# Patient Record
Sex: FEMALE | Race: BLACK OR AFRICAN AMERICAN | Employment: FULL TIME | URBAN - METROPOLITAN AREA
[De-identification: names, ages, dates, MRNs, and addresses within clinical notes are randomized per-mention and may not be internally consistent; named-entity substitution may affect disease eponyms.]

---

## 2021-01-25 ENCOUNTER — OFFICE VISIT (OUTPATIENT)
Dept: FAMILY MEDICINE CLINIC | Age: 26
End: 2021-01-25
Payer: COMMERCIAL

## 2021-01-25 VITALS
TEMPERATURE: 98.3 F | OXYGEN SATURATION: 100 % | HEIGHT: 62 IN | WEIGHT: 141.6 LBS | BODY MASS INDEX: 26.06 KG/M2 | HEART RATE: 106 BPM | SYSTOLIC BLOOD PRESSURE: 122 MMHG | DIASTOLIC BLOOD PRESSURE: 80 MMHG

## 2021-01-25 DIAGNOSIS — Z80.0 FAMILY HISTORY OF COLON CANCER IN FATHER: ICD-10-CM

## 2021-01-25 DIAGNOSIS — Z00.00 ROUTINE GENERAL MEDICAL EXAMINATION AT HEALTH CARE FACILITY: Primary | ICD-10-CM

## 2021-01-25 DIAGNOSIS — Z12.4 CERVICAL CANCER SCREENING: ICD-10-CM

## 2021-01-25 DIAGNOSIS — Z13.220 SCREENING FOR LIPID DISORDERS: ICD-10-CM

## 2021-01-25 DIAGNOSIS — Z11.3 SCREENING FOR STD (SEXUALLY TRANSMITTED DISEASE): ICD-10-CM

## 2021-01-25 DIAGNOSIS — Z13.1 SCREENING FOR DIABETES MELLITUS: ICD-10-CM

## 2021-01-25 PROCEDURE — 99385 PREV VISIT NEW AGE 18-39: CPT | Performed by: FAMILY MEDICINE

## 2021-01-25 NOTE — PROGRESS NOTES
Chief Complaint   Patient presents with    Complete Physical       1. Have you been to the ER, urgent care clinic since your last visit? Hospitalized since your last visit? No    2. Have you seen or consulted any other health care providers outside of the [address] [place] [address] since your last visit? Include any pap smears or colon screening. No    Health Maintenance Due   Topic Date Due    HPV Age 9Y-34Y (1 - 2-dose series) 03/13/2008    DTaP/Tdap/Td series (1 - Tdap) 03/13/2018    PAP AKA CERVICAL CYTOLOGY  03/13/2018    Flu Vaccine (1) 09/01/2020       3 most recent PHQ Screens 1/25/2021   Little interest or pleasure in doing things Not at all   Feeling down, depressed, irritable, or hopeless Not at all   Total Score PHQ 2 0       Abuse Screening Questionnaire 1/25/2021   Do you ever feel afraid of your partner? N   Are you in a relationship with someone who physically or mentally threatens you? N   Is it safe for you to go home? Y       No flowsheet data found.

## 2021-01-25 NOTE — PROGRESS NOTES
Patient Name: Laura Ortiz   MRN: [id number]    Cholo Vasquez is a 23 y.o. female who presents with the following: Here to establish care with new PCP. Never had a pap before. Has female partners. Unsure if she is UTD on vaccines. Went to [employer] for college. Her father was diagnosed with stage IV colon cancer last year at age 64years old. Pt asymptomatic. Review of Systems   Constitutional: Negative for chills, fever, malaise/fatigue and weight loss. HENT: Negative for hearing loss, nosebleeds and sore throat. Respiratory: Negative for cough, sputum production, shortness of breath and wheezing. Cardiovascular: Negative for chest pain, palpitations, leg swelling and PND. Gastrointestinal: Negative for abdominal pain, blood in stool, constipation, diarrhea, nausea and vomiting. Genitourinary: Negative for dysuria, frequency and urgency. Musculoskeletal: Negative for back pain, falls, joint pain, myalgias and neck pain. Skin: Negative for itching and rash. Neurological: Negative for dizziness, sensory change, focal weakness and loss of consciousness. Psychiatric/Behavioral: Negative for depression. The patient is not nervous/anxious. All other systems reviewed and are negative. The patient's medications, allergies, past medical history, surgical history, family history and social history were reviewed and updated where appropriate. Prior to Admission medications    Not on File       No Known Allergies      Past Medical History:   Diagnosis Date    Family history of colon cancer in father 1/25/2021       History reviewed. No pertinent surgical history.     Family History   Problem Relation Age of Onset    Hypertension Mother     Cancer Father         64 years   Zannie Cowden Asthma Sister     Asthma Brother        Social History     Socioeconomic History    Marital status: SINGLE     Spouse name: Not on file    Number of children: Not on file    Years of education: Not on file    Highest education level: Not on file   Occupational History    Not on file   Social Needs    Financial resource strain: Not on file    Food insecurity     Worry: Not on file     Inability: Not on file    Transportation needs     Medical: Not on file     Non-medical: Not on file   Tobacco Use    Smoking status: Never Smoker    Smokeless tobacco: Never Used   Substance and Sexual Activity    Alcohol use: Yes     Alcohol/week: 3.0 standard drinks     Types: 2 Glasses of wine, 1 Shots of liquor per week     Frequency: Monthly or less     Drinks per session: 1 or 2     Binge frequency: Never    Drug use: Never    Sexual activity: Yes     Partners: Female   Lifestyle    Physical activity     Days per week: Not on file     Minutes per session: Not on file    Stress: Not on file   Relationships    Social connections     Talks on phone: Not on file     Gets together: Not on file     Attends Church service: Not on file     Active member of club or organization: Not on file     Attends meetings of clubs or organizations: Not on file     Relationship status: Not on file    Intimate partner violence     Fear of current or ex partner: Not on file     Emotionally abused: Not on file     Physically abused: Not on file     Forced sexual activity: Not on file   Other Topics Concern    Not on file   Social History Narrative    Not on file         OBJECTIVE    Visit Vitals  /80 (BP 1 Location: Right arm, BP Patient Position: Sitting)   Pulse (!) 106   Temp 98.3 °F (36.8 °C) (Temporal)   Ht 5' 2\" (1.575 m)   Wt 141 lb 9.6 oz (64.2 kg)   LMP 01/04/2021 (Approximate)   SpO2 100%   BMI 25.90 kg/m²       Physical Exam  Constitutional:       General: She is not in acute distress. Appearance: She is not diaphoretic. HENT:      Head: Normocephalic.       Right Ear: External ear normal.      Left Ear: External ear normal.   Eyes:      Extraocular Movements: EOM normal. Conjunctiva/sclera: Conjunctivae normal.      Pupils: Pupils are equal, round, and reactive to light. Cardiovascular:      Rate and Rhythm: Normal rate and regular rhythm. Pulses: Intact distal pulses. Heart sounds: Normal heart sounds. No murmur. No friction rub. No gallop. Pulmonary:      Effort: Pulmonary effort is normal. No respiratory distress. Breath sounds: Normal breath sounds. No wheezing or rales. Abdominal:      General: Bowel sounds are normal. There is no distension. Palpations: Abdomen is soft. Tenderness: There is no abdominal tenderness. There is no guarding or rebound. Skin:     General: Skin is warm and dry. Neurological:      Mental Status: She is alert and oriented to person, place, and time. Psychiatric:         Judgment: Judgment normal.           ASSESSMENT AND PLAN  Consuelo Wallis is a 23 y.o. female who presents today for:    1. Routine general medical examination at health care facility  Reviewed age appropriate screening tests as recommended by the [place]PST[place] Preventive Services Database with patient today. 2. Screening for diabetes mellitus  - HEMOGLOBIN A1C WITH EAG; Future    3. Screening for lipid disorders  - CBC W/O DIFF; Future  - METABOLIC PANEL, COMPREHENSIVE; Future  - LIPID PANEL; Future    4. Screening for STD (sexually transmitted disease)  - CT/NG/T.VAGINALIS AMPLIFICATION; Future  - RPR; Future  - HIV 1/2 AG/AB, 4TH GENERATION,W RFLX CONFIRM; Future  - HEPATITIS PANEL, ACUTE; Future    5. Cervical cancer screening  - REFERRAL TO OBSTETRICS AND GYNECOLOGY    6. Family history of colon cancer in father  Will need earlier screening colonoscopy; possibly age 36 or sooner prn symptoms. There are no discontinued medications. Follow-up and Dispositions    · Return in about 1 year (around 1/25/2022) for CPE (30 min).    Follow-up and Disposition History          Treatment risks/benefits/costs/interactions/alternatives discussed with patient. Advised patient to call back or return to office if symptoms worsen/change/persist. If patient cannot reach us or should anything more severe/urgent arise he/she should proceed directly to the nearest emergency department. Discussed expected course/resolution/complications of diagnosis in detail with patient. Patient expressed understanding with the diagnosis and plan. Chetna Barrett M.D.

## 2021-01-28 LAB
ALBUMIN SERPL-MCNC: 4.4 G/DL (ref 3.5–5)
ALBUMIN/GLOB SERPL: 1.4 {RATIO} (ref 1.1–2.2)
ALP SERPL-CCNC: 73 U/L (ref 45–117)
ALT SERPL-CCNC: 18 U/L (ref 12–78)
ANION GAP SERPL CALC-SCNC: 6 MMOL/L (ref 5–15)
AST SERPL-CCNC: 20 U/L (ref 15–37)
BILIRUB SERPL-MCNC: 0.3 MG/DL (ref 0.2–1)
BUN SERPL-MCNC: 13 MG/DL (ref 6–20)
BUN/CREAT SERPL: 20 (ref 12–20)
C TRACH RRNA SPEC QL NAA+PROBE: NEGATIVE
CALCIUM SERPL-MCNC: 8.9 MG/DL (ref 8.5–10.1)
CHLORIDE SERPL-SCNC: 107 MMOL/L (ref 97–108)
CHOLEST SERPL-MCNC: 191 MG/DL
CO2 SERPL-SCNC: 24 MMOL/L (ref 21–32)
CREAT SERPL-MCNC: 0.65 MG/DL (ref 0.55–1.02)
ERYTHROCYTE [DISTWIDTH] IN BLOOD BY AUTOMATED COUNT: 12.7 % (ref 11.5–14.5)
EST. AVERAGE GLUCOSE BLD GHB EST-MCNC: 100 MG/DL
GLOBULIN SER CALC-MCNC: 3.2 G/DL (ref 2–4)
GLUCOSE SERPL-MCNC: 72 MG/DL (ref 65–100)
HAV IGM SER QL: NONREACTIVE
HBA1C MFR BLD: 5.1 % (ref 4–5.6)
HBV CORE IGM SER QL: NONREACTIVE
HBV SURFACE AG SER QL: <0.1 INDEX
HBV SURFACE AG SER QL: NEGATIVE
HCT VFR BLD AUTO: 39.4 % (ref 35–47)
HCV AB SERPL QL IA: NONREACTIVE
HCV COMMENT,HCGAC: NORMAL
HDLC SERPL-MCNC: 67 MG/DL
HDLC SERPL: 2.9 {RATIO} (ref 0–5)
HGB BLD-MCNC: 12.5 G/DL (ref 11.5–16)
HIV 1+2 AB+HIV1 P24 AG SERPL QL IA: NONREACTIVE
HIV12 RESULT COMMENT, HHIVC: NORMAL
LDLC SERPL CALC-MCNC: 110.4 MG/DL (ref 0–100)
LIPID PROFILE,FLP: ABNORMAL
MCH RBC QN AUTO: 27.1 PG (ref 26–34)
MCHC RBC AUTO-ENTMCNC: 31.7 G/DL (ref 30–36.5)
MCV RBC AUTO: 85.3 FL (ref 80–99)
N GONORRHOEA RRNA SPEC QL NAA+PROBE: NEGATIVE
NRBC # BLD: 0 K/UL (ref 0–0.01)
NRBC BLD-RTO: 0 PER 100 WBC
PLATELET # BLD AUTO: 291 K/UL (ref 150–400)
PMV BLD AUTO: 10.4 FL (ref 8.9–12.9)
POTASSIUM SERPL-SCNC: 4.2 MMOL/L (ref 3.5–5.1)
PROT SERPL-MCNC: 7.6 G/DL (ref 6.4–8.2)
RBC # BLD AUTO: 4.62 M/UL (ref 3.8–5.2)
RPR SER QL: NONREACTIVE
SODIUM SERPL-SCNC: 137 MMOL/L (ref 136–145)
SP1: NORMAL
SP2: NORMAL
SP3: NORMAL
SPECIMEN SOURCE: NORMAL
T VAGINALIS RRNA VAG QL NAA+PROBE: NEGATIVE
TRIGL SERPL-MCNC: 68 MG/DL (ref ?–150)
VLDLC SERPL CALC-MCNC: 13.6 MG/DL
WBC # BLD AUTO: 6.7 K/UL (ref 3.6–11)

## 2021-01-28 NOTE — PROGRESS NOTES
Dear Ms. Ami Pittman,    I wanted to follow up on your recent test results:    Cholesterol was a little high. I would encourage healthy food choices and regular exercise before starting medications for this.   Other labs are normal.

## 2021-03-16 ENCOUNTER — OFFICE VISIT (OUTPATIENT)
Dept: OBGYN CLINIC | Age: 26
End: 2021-03-16
Payer: COMMERCIAL

## 2021-03-16 VITALS
BODY MASS INDEX: 25.83 KG/M2 | SYSTOLIC BLOOD PRESSURE: 117 MMHG | WEIGHT: 140.38 LBS | HEIGHT: 62 IN | DIASTOLIC BLOOD PRESSURE: 62 MMHG

## 2021-03-16 DIAGNOSIS — Z01.419 ENCOUNTER FOR GYNECOLOGICAL EXAMINATION WITHOUT ABNORMAL FINDING: Primary | ICD-10-CM

## 2021-03-16 PROCEDURE — 99385 PREV VISIT NEW AGE 18-39: CPT | Performed by: OBSTETRICS & GYNECOLOGY

## 2021-03-16 NOTE — PROGRESS NOTES
Annual exam    Neville Lee is a 27 y.o. G0 presenting for annual exam.     Her main concerns today include occasionally with really painful cramping with menses. Reports sudden onset of sharp debilitating pains that last about 30 min. Reports it does not occur every month, but randomly. In total has happened about 10 times. Went to [employer]. 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 Accepts STI testing today. Ob/Gyn Hx:  G0  LMP- 3/4/21  Menses- regular, monthly, moderate flow, but occasionally with severe cramping   Contraception- n/a   STI- requested  ? SA- yes, female partners    Health maintenance:  Pap- never  Gardasil- interested in initiating series    Past Medical History:   Diagnosis Date    Family history of colon cancer in father 1/25/2021       No past surgical history on file. Family History   Problem Relation Age of Onset    Hypertension Mother     Cancer Father         64 years   Trina Solum Asthma Sister     Asthma Brother        Social History     Socioeconomic History    Marital status: SINGLE     Spouse name: Not on file    Number of children: Not on file    Years of education: Not on file    Highest education level: Not on file   Occupational History    Not on file   Social Needs    Financial resource strain: Not on file    Food insecurity     Worry: Not on file     Inability: Not on file    Transportation needs     Medical: Not on file     Non-medical: Not on file   Tobacco Use    Smoking status: Never Smoker    Smokeless tobacco: Never Used   Substance and Sexual Activity    Alcohol use:  Yes     Alcohol/week: 3.0 standard drinks     Types: 2 Glasses of wine, 1 Shots of liquor per week     Frequency: Monthly or less     Drinks per session: 1 or 2     Binge frequency: Never    Drug use: Never    Sexual activity: Yes     Partners: Female   Lifestyle    Physical activity     Days per week: Not on file     Minutes per session: Not on file    Stress: Not on file   Relationships    Social connections     Talks on phone: Not on file     Gets together: Not on file     Attends Taoist service: Not on file     Active member of club or organization: Not on file     Attends meetings of clubs or organizations: Not on file     Relationship status: Not on file    Intimate partner violence     Fear of current or ex partner: Not on file     Emotionally abused: Not on file     Physically abused: Not on file     Forced sexual activity: Not on file   Other Topics Concern    Not on file   Social History Narrative    Not on file           No Known Allergies    Review of Systems - History obtained from the patient  Constitutional: negative for weight loss, fever, night sweats  HEENT: negative for hearing loss, earache, congestion, snoring, sorethroat  CV: negative for chest pain, palpitations, edema  Resp: negative for cough, shortness of breath, wheezing  GI: negative for change in bowel habits, abdominal pain, black or bloody stools  : negative for frequency, dysuria, hematuria, vaginal discharge  MSK: negative for back pain, joint pain, muscle pain  Breast: negative for breast lumps, nipple discharge, galactorrhea  Skin :negative for itching, rash, hives  Neuro: negative for dizziness, headache, confusion, weakness  Psych: negative for anxiety, depression, change in mood  Heme/lymph: negative for bleeding, bruising, pallor    Physical Exam  Visit Vitals  /62   Ht 5' 2\" (1.575 m)   Wt 140 lb 6 oz (63.7 kg)   LMP 02/28/2021   BMI 25.67 kg/m²     Constitutional  · Appearance: well-nourished, well developed, alert, in no acute distress    HENT  · Head and Face: appears normal    Neck  · Inspection/Palpation: normal appearance, no masses or tenderness  · Lymph Nodes: no lymphadenopathy present  · Thyroid: gland size normal, nontender, no nodules or masses present on palpation    Chest  · Respiratory Effort: non-labored breathing  · Auscultation: CTAB, normal breath sounds    Cardiovascular  · Heart:  · Auscultation: regular rate and rhythm without murmur  · Extremities: no peripheral edema    Breasts  · Inspection of Breasts: breasts symmetrical, no skin changes, no discharge present, nipple appearance normal, no skin retraction present  · Palpation of Breasts and Axillae: no masses present on palpation, no breast tenderness  · Axillary Lymph Nodes: no lymphadenopathy present    Gastrointestinal  · Abdominal Examination: abdomen non-tender to palpation, normal bowel sounds, no masses present  · Liver and spleen: no hepatomegaly present, spleen not palpable  · Hernias: no hernias identified    Genitourinary  · External Genitalia: normal appearance for age, no discharge present, no tenderness present, no inflammatory lesions present, no masses present, no atrophy present  · Vagina: normal vaginal vault without central or paravaginal defects, no discharge present, no inflammatory lesions present, no masses present  · Bladder: non-tender to palpation  · Urethra: appears normal  · Cervix: normal   · Uterus: normal size, shape and consistency  · Adnexa: no adnexal tenderness present, no adnexal masses present  · Perineum: perineum within normal limits, no evidence of trauma, no rashes or skin lesions present    Skin  · General Inspection: no rash, no lesions identified    Neurologic/Psychiatric  · Mental Status:  · Orientation: grossly oriented to person, place and time  · Mood and Affect: mood normal, affect appropriate      Assessment/Plan:  27 y.o. G0 presenting for annual exam. Overall doing well. +dysmenorrhea/pelvic pains.     Health Maintenance:  -diet, exercise, healthy lifestyle  -pap today  -STI screening today (endocervix)  -Gardasil - will return to initiate series (after she has completed covid vaccination process, just received 1st dose of pfizer)  -[facility] referral  -reviewed hormonal contraceptive options for menstrual suppression for management of dysmenorrhea, pt would like to hold off for now  -nsaids prn    RTC: 1 month for US and riri Gould MD  3/16/2021  9:25 AM

## 2021-03-16 NOTE — PATIENT INSTRUCTIONS
Well Visit, Ages 25 to 48: Care Instructions Your Care Instructions Physical exams can help you stay healthy. Your doctor has checked your overall health and may have suggested ways to take good care of yourself. He or she also may have recommended tests. At home, you can help prevent illness with healthy eating, regular exercise, and other steps. Follow-up care is a key part of your treatment and safety. Be sure to make and go to all appointments, and call your doctor if you are having problems. It's also a good idea to know your test results and keep a list of the medicines you take. How can you care for yourself at home? · Reach and stay at a healthy weight. This will lower your risk for many problems, such as obesity, diabetes, heart disease, and high blood pressure. · Get at least 30 minutes of physical activity on most days of the week. Walking is a good choice. You also may want to do other activities, such as running, swimming, cycling, or playing tennis or team sports. Discuss any changes in your exercise program with your doctor. · Do not smoke or allow others to smoke around you. If you need help quitting, talk to your doctor about stop-smoking programs and medicines. These can increase your chances of quitting for good. · Talk to your doctor about whether you have any risk factors for sexually transmitted infections (STIs). Having one sex partner (who does not have STIs and does not have sex with anyone else) is a good way to avoid these infections. · Use birth control if you do not want to have children at this time. Talk with your doctor about the choices available and what might be best for you. · Protect your skin from too much sun. When you're outdoors from 10 a.m. to 4 p.m., stay in the shade or cover up with clothing and a hat with a wide brim. Wear sunglasses that block UV rays. Even when it's cloudy, put broad-spectrum sunscreen (SPF 30 or higher) on any exposed skin.  
· See a dentist one or two times a year for checkups and to have your teeth cleaned. · Wear a seat belt in the car. Follow your doctor's advice about when to have certain tests. These tests can spot problems early. For everyone · Cholesterol. Have the fat (cholesterol) in your blood tested after age 21. Your doctor will tell you how often to have this done based on your age, family history, or other things that can increase your risk for heart disease. · Blood pressure. Have your blood pressure checked during a routine doctor visit. Your doctor will tell you how often to check your blood pressure based on your age, your blood pressure results, and other factors. · Vision. Talk with your doctor about how often to have a glaucoma test. 
· Diabetes. Ask your doctor whether you should have tests for diabetes. · Colon cancer. Your risk for colorectal cancer gets higher as you get older. Some experts say that adults should start regular screening at age 48 and stop at age 76. Others say to start before age 48 or continue after age 76. Talk with your doctor about your risk and when to start and stop screening. For women · Breast exam and mammogram. Talk to your doctor about when you should have a clinical breast exam and a mammogram. Medical experts differ on whether and how often women under 50 should have these tests. Your doctor can help you decide what is right for you. · Cervical cancer screening test and pelvic exam. Begin with a Pap test at age 24. The test often is part of a pelvic exam. Starting at age 27, you may choose to have a Pap test, an HPV test, or both tests at the same time (called co-testing). Talk with your doctor about how often to have testing. · Tests for sexually transmitted infections (STIs). Ask whether you should have tests for STIs. You may be at risk if you have sex with more than one person, especially if your partners do not wear condoms. For men · Tests for sexually transmitted infections (STIs). Ask whether you should have tests for STIs. You may be at risk if you have sex with more than one person, especially if you do not wear a condom. · Testicular cancer exam. Ask your doctor whether you should check your testicles regularly. · Prostate exam. Talk to your doctor about whether you should have a blood test (called a PSA test) for prostate cancer. Experts differ on whether and when men should have this test. Some experts suggest it if you are older than 39 and are -American or have a father or brother who got prostate cancer when he was younger than 72. When should you call for help? Watch closely for changes in your health, and be sure to contact your doctor if you have any problems or symptoms that concern you. Where can you learn more? Go to http://radha-lani.info/ Enter P072 in the search box to learn more about \"Well Visit, Ages 25 to 48: Care Instructions. \" Current as of: May 27, 2020               Content Version: 12.6 © 2006-2020 [employer]. Care instructions adapted under license by [employer] (which disclaims liability or warranty for this information). If you have questions about a medical condition or this instruction, always ask your healthcare professional. [place] [address] any warranty or liability for your use of this information.

## 2021-03-20 LAB
C TRACH RRNA CVX QL NAA+PROBE: NEGATIVE
CYTOLOGIST CVX/VAG CYTO: NORMAL
CYTOLOGY CVX/VAG DOC CYTO: NORMAL
CYTOLOGY CVX/VAG DOC THIN PREP: NORMAL
DX ICD CODE: NORMAL
LABCORP, 190119: NORMAL
Lab: NORMAL
Lab: NORMAL
N GONORRHOEA RRNA CVX QL NAA+PROBE: NEGATIVE
OTHER STN SPEC: NORMAL
STAT OF ADQ CVX/VAG CYTO-IMP: NORMAL
T VAGINALIS RRNA SPEC QL NAA+PROBE: NEGATIVE

## 2021-04-13 ENCOUNTER — OFFICE VISIT (OUTPATIENT)
Dept: OBGYN CLINIC | Age: 26
End: 2021-04-13

## 2021-04-13 VITALS
DIASTOLIC BLOOD PRESSURE: 74 MMHG | WEIGHT: 139 LBS | SYSTOLIC BLOOD PRESSURE: 116 MMHG | HEIGHT: 62 IN | BODY MASS INDEX: 25.58 KG/M2

## 2021-04-13 DIAGNOSIS — R10.2 PELVIC PAIN IN FEMALE: ICD-10-CM

## 2021-04-13 DIAGNOSIS — N94.6 DYSMENORRHEA: ICD-10-CM

## 2021-04-13 DIAGNOSIS — Z23 ENCOUNTER FOR IMMUNIZATION: Primary | ICD-10-CM

## 2021-04-13 PROCEDURE — 90471 IMMUNIZATION ADMIN: CPT | Performed by: OBSTETRICS & GYNECOLOGY

## 2021-04-13 PROCEDURE — 90651 9VHPV VACCINE 2/3 DOSE IM: CPT | Performed by: OBSTETRICS & GYNECOLOGY

## 2021-04-13 PROCEDURE — 99213 OFFICE O/P EST LOW 20 MIN: CPT | Performed by: OBSTETRICS & GYNECOLOGY

## 2021-04-13 NOTE — PATIENT INSTRUCTIONS
Painful Menstrual Cramps: Care Instructions Your Care Instructions Painful menstrual cramps are very common. Many women go to the doctor because of bad cramps when they get their period. You may have cramps in your back, thighs, and belly. You may also have diarrhea, constipation, or nausea. Some women also get dizzy. Pain medicine and home treatment can help you feel better. Follow-up care is a key part of your treatment and safety. Be sure to make and go to all appointments, and call your doctor if you are having problems. It's also a good idea to know your test results and keep a list of the medicines you take. How can you care for yourself at home? · Take anti-inflammatory medicines for pain. Ibuprofen (Advil, Motrin) and naproxen (Aleve) usually work better than aspirin. ? Be safe with medicines. Talk to your doctor or pharmacist before you take any of these medicines. They may not be safe if you take other medicines or have other health problems. ? Start taking the recommended dose of pain medicine as soon as you start to feel pain. Or you can start on the day before your period. Keep taking the medicine for as many days as you have cramps. ? If anti-inflammatory medicines don't help, try acetaminophen (Tylenol). ? Do not take two or more pain medicines at the same time unless the doctor told you to. Many pain medicines have acetaminophen, which is Tylenol. Too much acetaminophen (Tylenol) can be harmful. ? Read and follow all instructions on the label. · Put a heating pad set on low or a hot water bottle on your belly. Or take a warm bath. Heat improves blood flow and may help with pain. · Lie down and put a pillow under your knees. Or lie on your side and bring your knees up to your chest. This will help with any back pressure. · Get at least 30 minutes of exercise on most days of the week. This improves blood flow and may decrease pain. Walking is a good choice.  You also may want to do other activities, such as running, swimming, cycling, or playing tennis or team sports. When should you call for help? Call your doctor now or seek immediate medical care if: 
  · You have new or worse belly or pelvic pain.  
  · You have severe vaginal bleeding. Watch closely for changes in your health, and be sure to contact your doctor if: 
  · You have unusual vaginal bleeding.  
  · You do not get better as expected. Where can you learn more? Go to http://www.gray.[employer]/ Enter [id number] in the search box to learn more about \"Painful Menstrual Cramps: Care Instructions. \" Current as of: July 17, 2020               Content Version: 12.8 © 2006-2021 [employer]. Care instructions adapted under license by [employer] (which disclaims liability or warranty for this information). If you have questions about a medical condition or this instruction, always ask your healthcare professional. [place] [address] any warranty or liability for your use of this information. HPV (Human Papillomavirus) Vaccine Gardasil®: What You Need to Know What is HPV? Genital human papillomavirus (HPV) is the most common sexually transmitted virus in the [place]. More than half of sexually active men and women are infected with HPV at some time in their lives. About 20 million Americans are currently infected, and about 6 million more get infected each year. HPV is usually spread through sexual contact. Most HPV infections don't cause any symptoms, and go away on their own. But HPV can cause cervical cancer in women. Cervical cancer is the 2nd leading cause of cancer deaths among women around the world. In the [place], about 12,000 women get cervical cancer every year and about 4,000 are expected to die from it.  
HPV is also associated with several less common cancers, such as vaginal and vulvar cancers in women, and anal and oropharyngeal (back of the throat, including base of tongue and tonsils) cancers in both men and women. HPV can also cause genital warts and warts in the throat. There is no cure for HPV infection, but some of the problems it causes can be treated. HPV vaccineWhy get vaccinated? The HPV vaccine you are getting is one of two vaccines that can be given to prevent HPV. It may be given to both males and females. This vaccine can prevent most cases of cervical cancer in females, if it is given before exposure to the virus. In addition, it can prevent vaginal and vulvar cancer in females, and genital warts and anal cancer in both males and females. Protection from HPV vaccine is expected to be long-lasting. But vaccination is not a substitute for cervical cancer screening. Women should still get regular Pap tests. Who should get this HPV vaccine and when? HPV vaccine is given as a 3-dose series · 1st Dose: Now 
· 2nd Dose: 1 to 2 months after Dose 1 · 3rd Dose: 6 months after Dose 1 Additional (booster) doses are not recommended. Routine vaccination · This HPV vaccine is recommended for girls and boys 6or 15years of age. It may be given starting at age 5. Why is HPV vaccine recommended at 6or 15years of age? HPV infection is easily acquired, even with only one sex partner. That is why it is important to get HPV vaccine before any sexual contact takes place. Also, response to the vaccine is better at this age than at older ages. Catch-up vaccination This vaccine is recommended for the following people who have not completed the 3-dose series: · Females 15 through 32years of age · Males 15 through 24years of age This vaccine may be given to men 25 through 32years of age who have not completed the 3-dose series. It is recommended for men through age 32 who have sex with men or whose immune system is weakened because of HIV infection, other illness, or medications.  
HPV vaccine may be given at the same time as other vaccines. Some people should not get HPV vaccine or should wait · Anyone who has ever had a life-threatening allergic reaction to any component of HPV vaccine, or to a previous dose of HPV vaccine, should not get the vaccine. Tell your doctor if the person getting vaccinated has any severe allergies, including an allergy to yeast. 
· HPV vaccine is not recommended for pregnant women. However, receiving HPV vaccine when pregnant is not a reason to consider terminating the pregnancy. Women who are breast feeding may get the vaccine. · People who are mildly ill when a dose of HPV vaccine is planned can still be vaccinated. People with a moderate or severe illness should wait until they are better. What are the risks from this vaccine? This HPV vaccine has been used in the [place] and around the world for about six years and has been very safe. However, any medicine could possibly cause a serious problem, such as a severe allergic reaction. The risk of any vaccine causing a serious injury, or death, is extremely small. Life-threatening allergic reactions from vaccines are very rare. If they do occur, it would be within a few minutes to a few hours after the vaccination. Several mild to moderate problems are known to occur with this HPV vaccine. These do not last long and go away on their own. · Reactions in the arm where the shot was given: 
? Pain (about 8 people in 10) ? Redness or swelling (about 1 person in 4) · Fever ? Mild (100°F) (about 1 person in 10) ? Moderate (102°F) (about 1 person in 72) · Other problems: 
? Headache (about 1 person in 3) · Fainting: Brief fainting spells and related symptoms (such as jerking movements) can happen after any medical procedure, including vaccination. Sitting or lying down for about 15 minutes after a vaccination can help prevent fainting and injuries caused by falls.  Tell your doctor if the patient feels dizzy or light-headed, or has vision changes or ringing in the ears. Like all vaccines, HPV vaccines will continue to be monitored for unusual or severe problems. What if there is a serious reaction? What should I look for? · Look for anything that concerns you, such as signs of a severe allergic reaction, very high fever, or behavior changes. Signs of a severe allergic reaction can include hives, swelling of the face and throat, difficulty breathing, a fast heartbeat, dizziness, and weakness. These would start a few minutes to a few hours after the vaccination. What should I do? · If you think it is a severe allergic reaction or other emergency that can't wait, call 9-1-1 or get the person to the nearest hospital. Otherwise, call your doctor. · Afterward, the reaction should be reported to the Vaccine Adverse Event Reporting System (VAERS). Your doctor might file this report, or you can do it yourself through the VAERS web site at www.vaers. [employer].gov, or by calling 6-763.260.9044. VAERS is only for reporting reactions. They do not give medical advice. The National Vaccine Injury Compensation Program 
The National Vaccine Injury Compensation Program (VICP) is a federal program that was created to compensate people who may have been injured by certain vaccines. Persons who believe they may have been injured by a vaccine can learn about the program and about filing a claim by calling 2-496.384.3337 or visiting the [address] website at www.[facility].gov/vaccinecompensation. How can I learn more? · Ask your doctor. · Call your local or state health department. · Contact the Centers for Disease Control and Prevention (CDC): 
? Call 0-109.470.9661 (1-800-CDC-INFO) or 
? Visit the CDC's website at www.cdc.gov/vaccines. Vaccine Information Statement (Interim) HPV Vaccine (Gardasil) 
(5/17/2013) 42 BEKA Reyes [id number] [facility] of [employer] and [employer] Centers for Disease Control and Prevention Many Vaccine Information Statements are available in [place] and other languages. See www.immunize.org/vis. Muchas hojas de información sobre vacunas están disponibles en español y en otros idiomas. Visite www.immunize.org/vis. Care instructions adapted under license by [employer] (which disclaims liability or warranty for this information). If you have questions about a medical condition or this instruction, always ask your healthcare professional. [place] [address] any warranty or liability for your use of this information.

## 2021-04-13 NOTE — PROGRESS NOTES
Problem Visit    Alexandra Barrios is a 27 y.o. G0 presenting for ultrasound and follow up of painful cramping with menses. Reports sudden onset of sharp debilitating pains that last about 30 min. Reports it does not occur every month, but randomly. In total has happened about 10 times. Has not occurred since her prior visit. Went to [employer]. 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 TRANSVAGINAL ULTRASOUND PERFORMED 4/13/21. THE UTERUS IS ANTEVERTED, NORMAL IN SIZE, AND ECHOGENICITY. ENDOMETRIUM MEASURES 7.68 MM IN THICKNESS. NO EVIDENCE OF MASSES OR ABNORMALITIES ARE  SEEN. THE RIGHT OVARY APPEARS WNL. THE LEFT OVARY APPEARS WNL. NO FREE FLUID SEEN IN THE CDS. Ob/Gyn Hx:  G0  LMP- 2 weeks ago  Menses- regular, monthly, moderate flow, but occasionally with severe cramping   Contraception- n/a   STI- requested  ? SA- yes, female partners    Health maintenance:  Pap-3/16/21 NILM  Gardasil- interested in initiating series today    Past Medical History:   Diagnosis Date    Family history of colon cancer in father 1/25/2021       No past surgical history on file. Family History   Problem Relation Age of Onset    Hypertension Mother     Cancer Father         64 years   [facility] Asthma Sister     Asthma Brother        Social History     Socioeconomic History    Marital status: SINGLE     Spouse name: Not on file    Number of children: Not on file    Years of education: Not on file    Highest education level: Not on file   Occupational History    Not on file   Social Needs    Financial resource strain: Not on file    Food insecurity     Worry: Not on file     Inability: Not on file    Transportation needs     Medical: Not on file     Non-medical: Not on file   Tobacco Use    Smoking status: Never Smoker    Smokeless tobacco: Never Used   Substance and Sexual Activity    Alcohol use:  Yes     Alcohol/week: 3.0 standard drinks     Types: 2 Glasses of wine, 1 Shots of liquor per week     Frequency: Monthly or less Drinks per session: 1 or 2     Binge frequency: Never    Drug use: Never    Sexual activity: Yes     Partners: Female   Lifestyle    Physical activity     Days per week: Not on file     Minutes per session: Not on file    Stress: Not on file   Relationships    Social connections     Talks on phone: Not on file     Gets together: Not on file     Attends Hindu service: Not on file     Active member of club or organization: Not on file     Attends meetings of clubs or organizations: Not on file     Relationship status: Not on file    Intimate partner violence     Fear of current or ex partner: Not on file     Emotionally abused: Not on file     Physically abused: Not on file     Forced sexual activity: Not on file   Other Topics Concern    Not on file   Social History Narrative    Not on file           No Known Allergies    Review of Systems - History obtained from the patient  Constitutional: negative for weight loss, fever, night sweats  HEENT: negative for hearing loss, earache, congestion, snoring, sorethroat  CV: negative for chest pain, palpitations, edema  Resp: negative for cough, shortness of breath, wheezing  GI: negative for change in bowel habits, abdominal pain, black or bloody stools  : negative for frequency, dysuria, hematuria, vaginal discharge  MSK: negative for back pain, joint pain, muscle pain  Breast: negative for breast lumps, nipple discharge, galactorrhea  Skin :negative for itching, rash, hives  Neuro: negative for dizziness, headache, confusion, weakness  Psych: negative for anxiety, depression, change in mood  Heme/lymph: negative for bleeding, bruising, pallor    Physical Exam  Visit Vitals  /74   Ht 5' 2\" (1.575 m)   Wt 139 lb (63 kg)   BMI 25.42 kg/m²       Constitutional  · Appearance: well-nourished, well developed, alert, in no acute distress    HENT  · Head and Face: appears normal    Neck  · Inspection/Palpation: normal appearance, no masses or tenderness  · Lymph Nodes: no lymphadenopathy present  · Thyroid: gland size normal, nontender, no nodules or masses present on palpation    Chest  · Respiratory Effort: non-labored breathing  · Auscultation: CTAB, normal breath sounds    Cardiovascular  · Heart:  · Auscultation: regular rate and rhythm without murmur  · Extremities: no peripheral edema    Gastrointestinal  · Abdominal Examination: abdomen non-tender to palpation, normal bowel sounds, no masses present  · Liver and spleen: no hepatomegaly present, spleen not palpable  · Hernias: no hernias identified    Skin  · General Inspection: no rash, no lesions identified    Neurologic/Psychiatric  · Mental Status:  · Orientation: grossly oriented to person, place and time  · Mood and Affect: mood normal, affect appropriate      Assessment/Plan:  27 y.o. G0 presenting for follow up of pelvic pains.     -TVUS findings reviewed with pt today, reassurance provided of normal pelvic structures  -discussed cannot exclude endometriosis or occasional ovarian cysts   -declines hormonal options for menstrual management of dysmenorrhea  -advise bowel regimen and follow up with GI for any recurrent symptoms  -initiation of gardasil vaccine series today    RTC: 1 year for AE or sooner trip Matos Arm, MD  4/13/2021  1:38 PM

## 2021-06-14 ENCOUNTER — OFFICE VISIT (OUTPATIENT)
Dept: OBGYN CLINIC | Age: 26
End: 2021-06-14
Payer: COMMERCIAL

## 2021-06-14 DIAGNOSIS — Z23 ENCOUNTER FOR IMMUNIZATION: Primary | ICD-10-CM

## 2021-06-14 PROCEDURE — 90651 9VHPV VACCINE 2/3 DOSE IM: CPT | Performed by: OBSTETRICS & GYNECOLOGY

## 2021-06-14 PROCEDURE — 90471 IMMUNIZATION ADMIN: CPT | Performed by: OBSTETRICS & GYNECOLOGY

## 2021-06-14 NOTE — PATIENT INSTRUCTIONS
HPV (Human Papillomavirus) Vaccine Gardasil®: What You Need to Know What is HPV? Genital human papillomavirus (HPV) is the most common sexually transmitted virus in the [place]. More than half of sexually active men and women are infected with HPV at some time in their lives. About 20 million Americans are currently infected, and about 6 million more get infected each year. HPV is usually spread through sexual contact. Most HPV infections don't cause any symptoms, and go away on their own. But HPV can cause cervical cancer in women. Cervical cancer is the 2nd leading cause of cancer deaths among women around the world. In the [place], about 12,000 women get cervical cancer every year and about 4,000 are expected to die from it. HPV is also associated with several less common cancers, such as vaginal and vulvar cancers in women, and anal and oropharyngeal (back of the throat, including base of tongue and tonsils) cancers in both men and women. HPV can also cause genital warts and warts in the throat. There is no cure for HPV infection, but some of the problems it causes can be treated. HPV vaccineWhy get vaccinated? The HPV vaccine you are getting is one of two vaccines that can be given to prevent HPV. It may be given to both males and females. This vaccine can prevent most cases of cervical cancer in females, if it is given before exposure to the virus. In addition, it can prevent vaginal and vulvar cancer in females, and genital warts and anal cancer in both males and females. Protection from HPV vaccine is expected to be long-lasting. But vaccination is not a substitute for cervical cancer screening. Women should still get regular Pap tests. Who should get this HPV vaccine and when? HPV vaccine is given as a 3-dose series · 1st Dose: Now 
· 2nd Dose: 1 to 2 months after Dose 1 · 3rd Dose: 6 months after Dose 1 Additional (booster) doses are not recommended. Routine vaccination · This HPV vaccine is recommended for girls and boys 6or 15years of age. It may be given starting at age 5. Why is HPV vaccine recommended at 6or 15years of age? HPV infection is easily acquired, even with only one sex partner. That is why it is important to get HPV vaccine before any sexual contact takes place. Also, response to the vaccine is better at this age than at older ages. Catch-up vaccination This vaccine is recommended for the following people who have not completed the 3-dose series: · Females 15 through 32years of age · Males 15 through 24years of age This vaccine may be given to men 25 through 32years of age who have not completed the 3-dose series. It is recommended for men through age 32 who have sex with men or whose immune system is weakened because of HIV infection, other illness, or medications. HPV vaccine may be given at the same time as other vaccines. Some people should not get HPV vaccine or should wait · Anyone who has ever had a life-threatening allergic reaction to any component of HPV vaccine, or to a previous dose of HPV vaccine, should not get the vaccine. Tell your doctor if the person getting vaccinated has any severe allergies, including an allergy to yeast. 
· HPV vaccine is not recommended for pregnant women. However, receiving HPV vaccine when pregnant is not a reason to consider terminating the pregnancy. Women who are breast feeding may get the vaccine. · People who are mildly ill when a dose of HPV vaccine is planned can still be vaccinated. People with a moderate or severe illness should wait until they are better. What are the risks from this vaccine? This HPV vaccine has been used in the [place] and around the world for about six years and has been very safe. However, any medicine could possibly cause a serious problem, such as a severe allergic reaction. The risk of any vaccine causing a serious injury, or death, is extremely small.  
Life-threatening allergic reactions from vaccines are very rare. If they do occur, it would be within a few minutes to a few hours after the vaccination. Several mild to moderate problems are known to occur with this HPV vaccine. These do not last long and go away on their own. · Reactions in the arm where the shot was given: 
? Pain (about 8 people in 10) ? Redness or swelling (about 1 person in 4) · Fever ? Mild (100°F) (about 1 person in 10) ? Moderate (102°F) (about 1 person in 72) · Other problems: 
? Headache (about 1 person in 3) · Fainting: Brief fainting spells and related symptoms (such as jerking movements) can happen after any medical procedure, including vaccination. Sitting or lying down for about 15 minutes after a vaccination can help prevent fainting and injuries caused by falls. Tell your doctor if the patient feels dizzy or light-headed, or has vision changes or ringing in the ears. Like all vaccines, HPV vaccines will continue to be monitored for unusual or severe problems. What if there is a serious reaction? What should I look for? · Look for anything that concerns you, such as signs of a severe allergic reaction, very high fever, or behavior changes. Signs of a severe allergic reaction can include hives, swelling of the face and throat, difficulty breathing, a fast heartbeat, dizziness, and weakness. These would start a few minutes to a few hours after the vaccination. What should I do? · If you think it is a severe allergic reaction or other emergency that can't wait, call 9-1-1 or get the person to the nearest hospital. Otherwise, call your doctor. · Afterward, the reaction should be reported to the Vaccine Adverse Event Reporting System (VAERS). Your doctor might file this report, or you can do it yourself through the VAERS web site at www.vaers. hhs.gov, or by calling 2-586.382.8605. VAERS is only for reporting reactions. They do not give medical advice.  
The National Vaccine Injury Compensation Program 
The [employer] Injury Compensation Program (VICP) is a federal program that was created to compensate people who may have been injured by certain vaccines. Persons who believe they may have been injured by a vaccine can learn about the program and about filing a claim by calling 4-678.260.1769 or visiting the [employer] website at www.[facility].gov/vaccinecompensation. How can I learn more? · Ask your doctor. · Call your local or state health department. · Contact the Centers for Disease Control and Prevention (CDC): 
? Call 3-435.986.5291 (1-800-CDC-INFO) or 
? Visit the CDC's website at www.cdc.gov/vaccines. Vaccine Information Statement (Interim) HPV Vaccine (Gardasil) 
(5/17/2013) 42 BEKA Viveros [id number] [facility] and [employer] Centers for Disease Control and Prevention Many Vaccine Information Statements are available in [place] and other languages. See www.immunize.org/vis. Muchas hojas de información sobre vacunas están disponibles en español y en otros idiomas. Visite www.immunize.org/vis. Care instructions adapted under license by [employer] (which disclaims liability or warranty for this information). If you have questions about a medical condition or this instruction, always ask your healthcare professional. [address] any warranty or liability for your use of this information.

## 2022-03-19 PROBLEM — Z80.0 FAMILY HISTORY OF COLON CANCER IN FATHER: Status: ACTIVE | Noted: 2021-01-25

## 2022-04-19 ENCOUNTER — OFFICE VISIT (OUTPATIENT)
Dept: FAMILY MEDICINE CLINIC | Age: 27
End: 2022-04-19
Payer: COMMERCIAL

## 2022-04-19 VITALS
SYSTOLIC BLOOD PRESSURE: 124 MMHG | HEIGHT: 62 IN | TEMPERATURE: 98.2 F | RESPIRATION RATE: 16 BRPM | HEART RATE: 84 BPM | WEIGHT: 136.9 LBS | OXYGEN SATURATION: 99 % | DIASTOLIC BLOOD PRESSURE: 70 MMHG | BODY MASS INDEX: 25.19 KG/M2

## 2022-04-19 DIAGNOSIS — K21.9 GASTROESOPHAGEAL REFLUX DISEASE, UNSPECIFIED WHETHER ESOPHAGITIS PRESENT: ICD-10-CM

## 2022-04-19 DIAGNOSIS — R59.9 SWOLLEN LYMPH NODES: Primary | ICD-10-CM

## 2022-04-19 PROCEDURE — 99214 OFFICE O/P EST MOD 30 MIN: CPT | Performed by: FAMILY MEDICINE

## 2022-04-19 NOTE — PROGRESS NOTES
Patient Name: José Luis Corral   MRN: [id number]    Paige Nicholas is a 24 y.o. female who presents with the following:     Reports 6 year hx of lumps on the right side of neck. This past Sunday, there was a lump unsure her right jaw that was swollen and painful. It resolved on its own. Denies URI symptoms, fevers, dental work. Reports 5 days of burning sensation in the middle of her stomach. Mostly first thing in the morning. Feels better after eating. Has not changed her diet. Had drank once since then. Tums did not help. Denies changes in BM pattern, vomiting, nausea, or belching. Review of Systems   Constitutional: Negative for fever, malaise/fatigue and weight loss. Respiratory: Negative for cough, hemoptysis, shortness of breath and wheezing. Cardiovascular: Negative for chest pain, palpitations, leg swelling and PND. Gastrointestinal: Positive for heartburn. Negative for abdominal pain, blood in stool, constipation, diarrhea, melena, nausea and vomiting. The patient's medications, allergies, past medical history, surgical history, family history and social history were reviewed and updated where appropriate. No current outpatient medications on file. No Known Allergies      OBJECTIVE    Visit Vitals  /70 (BP 1 Location: Right arm, BP Patient Position: Sitting, BP Cuff Size: Adult)   Pulse 84   Temp 98.2 °F (36.8 °C) (Temporal)   Resp 16   Ht 5' 2\" (1.575 m)   Wt 136 lb 14.4 oz (62.1 kg)   SpO2 99%   BMI 25.04 kg/m²       Physical Exam  Vitals and nursing note reviewed. Constitutional:       General: She is not in acute distress. Appearance: She is not diaphoretic. HENT:      Head: Normocephalic. Right Ear: External ear normal.      Left Ear: External ear normal.   Eyes:      Conjunctiva/sclera: Conjunctivae normal.      Pupils: Pupils are equal, round, and reactive to light. Cardiovascular:      Rate and Rhythm: Normal rate and regular rhythm.       Heart sounds: Normal heart sounds. No murmur heard. No friction rub. No gallop. Pulmonary:      Effort: Pulmonary effort is normal. No respiratory distress. Breath sounds: Normal breath sounds. No wheezing or rales. Abdominal:      General: Bowel sounds are normal. There is no distension. Palpations: Abdomen is soft. Tenderness: There is no abdominal tenderness. There is no guarding or rebound. Lymphadenopathy:      Cervical: No cervical adenopathy (no palpable mass or significant adenopathy). Skin:     General: Skin is warm and dry. Neurological:      Mental Status: She is alert and oriented to person, place, and time. Psychiatric:         Mood and Affect: Affect normal.         Cognition and Memory: Memory normal.         Judgment: Judgment normal.           ASSESSMENT AND PLAN  Vega Villasenor is a 24 y.o. female who presents today for:    1. Swollen lymph nodes  Will evaluate with ultrasound. - US THYROID/PARATHYROID/SOFT TISS; Future    2. Gastroesophageal reflux disease, unspecified whether esophagitis present  Recommend scheduled OTC Pepcid for two weeks; if no improvement, consider labs/H pylori testing. There are no discontinued medications. Treatment risks/benefits/costs/interactions/alternatives discussed with patient. Advised patient to call back or return to office if symptoms worsen/change/persist. If patient cannot reach us or should anything more severe/urgent arise he/she should proceed directly to the nearest emergency department. Discussed expected course/resolution/complications of diagnosis in detail with patient. Patient expressed understanding with the diagnosis and plan. This dictation may have been completed with Dragon, the computer voice recognition software. Unanticipated grammatical, syntax, homophones, and other interpretive errors are sometimes inadvertently transcribed by the computer software.   Please disregard any errors that have escaped final proofreading. Chetna Cohen M.D.

## 2022-04-19 NOTE — PROGRESS NOTES
Chief Complaint   Patient presents with    Mass     on jawline, pain 8/10. patient noticed 04/10/22. has gotten smaller since scheduling appointment.  Chest Pain     patient states in the morning there is a burning sensation inbetween chest and stomach that moves up to chest. pain 7/10 when occuring        1. \"Have you been to the ER, urgent care clinic since your last visit? Hospitalized since your last visit? \" No    2. \"Have you seen or consulted any other health care providers outside of the [address] [place] [address] since your last visit? \" No     3. For patients aged 39-70: Has the patient had a colonoscopy / FIT/ Cologuard? NA - based on age      If the patient is female:    4. For patients aged 41-77: Has the patient had a mammogram within the past 2 years? NA - based on age or sex      11. For patients aged 21-65: Has the patient had a pap smear?  Yes - no Care Gap present    3 most recent PHQ Screens 4/19/2022   Little interest or pleasure in doing things Not at all   Feeling down, depressed, irritable, or hopeless Not at all   Total Score PHQ 2 0

## 2022-04-22 ENCOUNTER — HOSPITAL ENCOUNTER (OUTPATIENT)
Dept: ULTRASOUND IMAGING | Age: 27
Discharge: HOME | End: 2022-04-22
Attending: FAMILY MEDICINE
Payer: COMMERCIAL

## 2022-04-22 ENCOUNTER — TRANSCRIBE ORDER (OUTPATIENT)
Dept: SCHEDULING | Age: 27
End: 2022-04-22

## 2022-04-22 DIAGNOSIS — R59.9 SWOLLEN LYMPH NODES: ICD-10-CM

## 2022-04-22 DIAGNOSIS — R59.9 SWELLING OF LYMPH NODES: Primary | ICD-10-CM

## 2022-04-22 PROCEDURE — 76536 US EXAM OF HEAD AND NECK: CPT

## 2022-04-22 NOTE — PROGRESS NOTES
Dear Ms. Rashmi Lyn,    I wanted to follow up on your recent test results: Your ultrasound shows normal sized lymph nodes along the right side of your neck. No follow up is needed at this time unless you have pain in the area that persists.

## 2022-07-05 ENCOUNTER — NURSE TRIAGE (OUTPATIENT)
Dept: OTHER | Facility: CLINIC | Age: 27
End: 2022-07-05

## 2022-07-05 NOTE — TELEPHONE ENCOUNTER
Received call from Jeffery Linares at [facility] with [employer]. Subjective: Caller states \"last week I had a rugby game and someone fell on my chest and the pain hadn't stopped. \"     Current Symptoms: appt was scheduled for today, calling to reschedule due COVID self swab yesterday. No bruising to chest.     Onset: 1 week ago; sudden    Associated Symptoms: pain when sitting up. Pain when sneezing. No pain at rest or deep breath. No pain with palpation. Pain Severity: 7/10; sharp and stabbing; intermittent    Temperature: denies     What has been tried: Ibuprofen    LMP: 2 weeks ago Pregnant: No    Recommended disposition: Home Care. Pt had an appt today and was calling to re-schedule an appt due to +home COVID test. Still wants to see PCP. Care advice provided, patient verbalizes understanding; denies any other questions or concerns; instructed to call back for any new or worsening symptoms. Belle Low at [facility] calling patient to schedule    Attention Provider: Thank you for allowing me to participate in the care of your patient. The patient was connected to triage in response to information provided to the [facility]. Please do not respond through this encounter as the response is not directed to a shared pool.       Reason for Disposition   Chest wall pain or stiffness from bending or twisting injury    Protocols used: CHEST INJURY-ADULT-[place]

## 2022-07-08 ENCOUNTER — VIRTUAL VISIT (OUTPATIENT)
Dept: FAMILY MEDICINE CLINIC | Age: 27
End: 2022-07-08
Payer: COMMERCIAL

## 2022-07-08 DIAGNOSIS — U07.1 COVID-19 VIRUS INFECTION: ICD-10-CM

## 2022-07-08 DIAGNOSIS — R07.89 CHEST WALL PAIN: Primary | ICD-10-CM

## 2022-07-08 PROCEDURE — 99213 OFFICE O/P EST LOW 20 MIN: CPT | Performed by: FAMILY MEDICINE

## 2022-07-08 NOTE — PROGRESS NOTES
Caroline Calvillo, was evaluated through a synchronous (real-time) audio-video encounter. The patient (or guardian if applicable) is aware that this is a billable service, which includes applicable co-pays. This Virtual Visit was conducted with patient's (and/or legal guardian's) consent. The visit was conducted pursuant to the emergency declaration under the [facility], [address] [place] [address] and the [employer] Act. Patient identification was verified, and a caregiver was present when appropriate. The patient was located at: Home: [place] [address]  The provider was located at: Facility ([facility] Department): [address] [place] [address]       Phyllis Carrion MD    922  Subjective:   Caroline Calvillo is a 24 y.o. F who was seen for: While playing rugby last week, someone fell on her chest by sitting on her. Since then, she has had chest pain when she moves a certain way. Denies any SOB, consistent chest pain. Getting a little better. Hasn't tried anything. Also tested positive for COVID 5 days ago; doing well. No current outpatient medications on file. No Known Allergies    Review of Systems   Constitutional: Negative for fever, malaise/fatigue and weight loss. Respiratory: Negative for cough, hemoptysis, shortness of breath and wheezing. Cardiovascular: Positive for chest pain. Negative for palpitations, leg swelling and PND. Gastrointestinal: Negative for abdominal pain, constipation, diarrhea, nausea and vomiting.        Objective:     Patient-Reported Vitals 7/8/2022   Patient-Reported Weight 130   Patient-Reported Pulse 64   Patient-Reported SpO2 98   Patient-Reported LMP 6/15/22        Constitutional: [x] Appears well-developed and well-nourished [x] No apparent distress      [] Abnormal -     Mental status: [x] Alert and awake  [x] Oriented to person/place/time [x] Able to follow commands    [] Abnormal -     Eyes:   EOM    [x]  Normal    [] Abnormal -   Sclera  [x]  Normal    [] Abnormal -          Discharge []  None visible   [] Abnormal -     HENT: [x] Normocephalic, atraumatic  [] Abnormal -   [] Mouth/Throat: Mucous membranes are moist    Neck: [x] No visualized mass [] Abnormal -     Pulmonary/Chest: [x] Respiratory effort normal   [x] No visualized signs of difficulty breathing or respiratory distress        [] Abnormal -         Skin:        [x] No significant exanthematous lesions or discoloration noted on facial skin         [] Abnormal -            Psychiatric:       [x] Normal Affect [] Abnormal -        [x] No Hallucinations    Other pertinent observable physical exam findings:    Assessment & Plan:   Sarah Montoya is a 24 y.o. female who presents today for:    1. Chest wall pain  Likely contusion to chest wall. Recommend NSAID/Tylenol, heat. If persistent, recommend CXR. 2. COVID-19 virus infection  Clinically well. There are no discontinued medications. Treatment risks/benefits/costs/interactions/alternatives discussed with patient. Advised patient to call back or return to office if symptoms worsen/change/persist. If patient cannot reach us or should anything more severe/urgent arise he/she should proceed directly to the nearest emergency department. Discussed expected course/resolution/complications of diagnosis in detail with patient. Patient expressed understanding with the diagnosis and plan. This dictation may have been completed with Dragon, the computer voice recognition software. Unanticipated grammatical, syntax, homophones, and other interpretive errors are sometimes inadvertently transcribed by the computer software. Please disregard any errors that have escaped final proofreading. Chetna Cruz M.D.

## 2022-08-02 ENCOUNTER — OFFICE VISIT (OUTPATIENT)
Dept: FAMILY MEDICINE CLINIC | Age: 27
End: 2022-08-02
Payer: COMMERCIAL

## 2022-08-02 VITALS
WEIGHT: 130.6 LBS | RESPIRATION RATE: 16 BRPM | DIASTOLIC BLOOD PRESSURE: 78 MMHG | HEIGHT: 62 IN | OXYGEN SATURATION: 99 % | BODY MASS INDEX: 24.03 KG/M2 | HEART RATE: 78 BPM | TEMPERATURE: 98.6 F | SYSTOLIC BLOOD PRESSURE: 118 MMHG

## 2022-08-02 DIAGNOSIS — Z13.220 SCREENING FOR LIPID DISORDERS: ICD-10-CM

## 2022-08-02 DIAGNOSIS — S93.402D SPRAIN OF LEFT ANKLE, UNSPECIFIED LIGAMENT, SUBSEQUENT ENCOUNTER: Primary | ICD-10-CM

## 2022-08-02 DIAGNOSIS — Z00.00 ROUTINE GENERAL MEDICAL EXAMINATION AT HEALTH CARE FACILITY: ICD-10-CM

## 2022-08-02 DIAGNOSIS — Z13.1 SCREENING FOR DIABETES MELLITUS: ICD-10-CM

## 2022-08-02 PROCEDURE — 99395 PREV VISIT EST AGE 18-39: CPT | Performed by: FAMILY MEDICINE

## 2022-08-02 PROCEDURE — 99213 OFFICE O/P EST LOW 20 MIN: CPT | Performed by: FAMILY MEDICINE

## 2022-08-02 NOTE — PROGRESS NOTES
Patient Name: Neymar An   MRN: [id number]    Jennifer Mccormack is a 24 y.o. female who presents with the following:     Cervical Cancer Screening: up to date. Colon Cancer Screening: needs to start at age 38 given her father just passed from colon cancer (diagnosed at age 54). CAD risk factors:  HTN: wnl;  BP Readings from Last 3 Encounters:   08/02/22 118/78   04/19/22 124/70   04/13/21 116/74     Lipid: due  Lab Results   Component Value Date/Time    Cholesterol, total 191 01/25/2021 03:39 PM    HDL Cholesterol 67 01/25/2021 03:39 PM    LDL, calculated 110.4 (H) 01/25/2021 03:39 PM    VLDL, calculated 13.6 01/25/2021 03:39 PM    Triglyceride 68 01/25/2021 03:39 PM    CHOL/HDL Ratio 2.9 01/25/2021 03:39 PM     DM: due  Lab Results   Component Value Date/Time    Hemoglobin A1c 5.1 01/25/2021 03:39 PM     Twisted her left ankle 5 weeks ago during a rugby match. Ankle became swollen and bruised. Went to urgent care one week later and had a normal xray. Still having some pain along the left side. Review of Systems   Constitutional:  Negative for fever, malaise/fatigue and weight loss. Respiratory:  Negative for cough, hemoptysis, shortness of breath and wheezing. Cardiovascular:  Negative for chest pain, palpitations, leg swelling and PND. Gastrointestinal:  Negative for abdominal pain, constipation, diarrhea, nausea and vomiting. Musculoskeletal:  Positive for joint pain. The patient's medications, allergies, past medical history, surgical history, family history and social history were reviewed and updated where appropriate. No current outpatient medications on file.     No Known Allergies      OBJECTIVE    Visit Vitals  /78 (BP 1 Location: Left upper arm, BP Patient Position: Sitting, BP Cuff Size: Adult)   Pulse 78   Temp 98.6 °F (37 °C) (Temporal)   Resp 16   Ht 5' 2\" (1.575 m)   Wt 130 lb 9.6 oz (59.2 kg)   SpO2 99%   BMI 23.89 kg/m²       Physical Exam  Vitals and nursing note reviewed. Constitutional:       General: She is not in acute distress. Appearance: She is not diaphoretic. Eyes:      Conjunctiva/sclera: Conjunctivae normal.      Pupils: Pupils are equal, round, and reactive to light. Cardiovascular:      Rate and Rhythm: Normal rate and regular rhythm. Heart sounds: Normal heart sounds. No murmur heard. No friction rub. No gallop. Pulmonary:      Effort: Pulmonary effort is normal. No respiratory distress. Breath sounds: Normal breath sounds. No wheezing. Musculoskeletal:      Left ankle: No swelling, deformity or ecchymosis. Tenderness (lateral malleolus) present. Normal range of motion. Skin:     General: Skin is warm and dry. Neurological:      Mental Status: She is alert. ASSESSMENT AND PLAN  Sara Pate is a 24 y.o. female who presents today for:    1. Routine general medical examination at health care facility  Reviewed age appropriate screening tests as recommended by the [place]PSTF Preventive Services Database with patient today.  - HEMOGLOBIN A1C WITH EAG; Future  - METABOLIC PANEL, COMPREHENSIVE; Future  - CBC W/O DIFF; Future  - LIPID PANEL; Future  - LIPID PANEL  - METABOLIC PANEL, COMPREHENSIVE  - CBC W/O DIFF  - HEMOGLOBIN A1C WITH EAG    2. Screening for diabetes mellitus  - METABOLIC PANEL, COMPREHENSIVE; Future  - METABOLIC PANEL, COMPREHENSIVE    3. Screening for lipid disorders  - HEMOGLOBIN A1C WITH EAG; Future  - CBC W/O DIFF; Future  - LIPID PANEL; Future  - LIPID PANEL  - CBC W/O DIFF  - HEMOGLOBIN A1C WITH EAG    4. Sprain of left ankle, unspecified ligament, subsequent encounter  - REFERRAL TO PHYSICAL THERAPY    There are no discontinued medications. Treatment risks/benefits/costs/interactions/alternatives discussed with patient.   Advised patient to call back or return to office if symptoms worsen/change/persist. If patient cannot reach us or should anything more severe/urgent arise he/she should proceed directly to the nearest emergency department. Discussed expected course/resolution/complications of diagnosis in detail with patient. Patient expressed understanding with the diagnosis and plan. This dictation may have been completed with Dragon, the computer voice recognition software. Unanticipated grammatical, syntax, homophones, and other interpretive errors are sometimes inadvertently transcribed by the computer software. Please disregard any errors that have escaped final proofreading. Chetna Denton M.D.

## 2022-08-02 NOTE — PROGRESS NOTES
Chief Complaint   Patient presents with    Complete Physical       1. Have you been to the ER, urgent care clinic since your last visit? Hospitalized since your last visit? No    2. Have you seen or consulted any other health care providers outside of the [address] [place] [address] since your last visit? Include any pap smears or colon screening. No    3. For patients over 45: Has the patient had a colonoscopy? NA - based on age     If the patient is female:    4. For patients over 36: Has the patient had a mammogram? NA - based on age    11. For patients over 21: Has the patient had a pap smear? Yes - no Care Gap present    3 most recent PHQ Screens 8/2/2022   Little interest or pleasure in doing things Not at all   Feeling down, depressed, irritable, or hopeless Not at all   Total Score PHQ 2 0       Abuse Screening Questionnaire 8/2/2022   Do you ever feel afraid of your partner? N   Are you in a relationship with someone who physically or mentally threatens you? N   Is it safe for you to go home?  Manju Kirby

## 2022-08-03 LAB
ALBUMIN SERPL-MCNC: 3.9 G/DL (ref 3.5–5)
ALBUMIN/GLOB SERPL: 1.3 {RATIO} (ref 1.1–2.2)
ALP SERPL-CCNC: 66 U/L (ref 45–117)
ALT SERPL-CCNC: 14 U/L (ref 12–78)
ANION GAP SERPL CALC-SCNC: 8 MMOL/L (ref 5–15)
AST SERPL-CCNC: 12 U/L (ref 15–37)
BILIRUB SERPL-MCNC: 0.3 MG/DL (ref 0.2–1)
BUN SERPL-MCNC: 12 MG/DL (ref 6–20)
BUN/CREAT SERPL: 18 (ref 12–20)
CALCIUM SERPL-MCNC: 9.1 MG/DL (ref 8.5–10.1)
CHLORIDE SERPL-SCNC: 106 MMOL/L (ref 97–108)
CHOLEST SERPL-MCNC: 148 MG/DL
CO2 SERPL-SCNC: 28 MMOL/L (ref 21–32)
CREAT SERPL-MCNC: 0.68 MG/DL (ref 0.55–1.02)
ERYTHROCYTE [DISTWIDTH] IN BLOOD BY AUTOMATED COUNT: 13.4 % (ref 11.5–14.5)
EST. AVERAGE GLUCOSE BLD GHB EST-MCNC: 100 MG/DL
GLOBULIN SER CALC-MCNC: 3.1 G/DL (ref 2–4)
GLUCOSE SERPL-MCNC: 92 MG/DL (ref 65–100)
HBA1C MFR BLD: 5.1 % (ref 4–5.6)
HCT VFR BLD AUTO: 36.1 % (ref 35–47)
HDLC SERPL-MCNC: 66 MG/DL
HDLC SERPL: 2.2 {RATIO} (ref 0–5)
HGB BLD-MCNC: 11.1 G/DL (ref 11.5–16)
LDLC SERPL CALC-MCNC: 63.6 MG/DL (ref 0–100)
MCH RBC QN AUTO: 27.5 PG (ref 26–34)
MCHC RBC AUTO-ENTMCNC: 30.7 G/DL (ref 30–36.5)
MCV RBC AUTO: 89.6 FL (ref 80–99)
NRBC # BLD: 0 K/UL (ref 0–0.01)
NRBC BLD-RTO: 0 PER 100 WBC
PLATELET # BLD AUTO: 291 K/UL (ref 150–400)
PMV BLD AUTO: 9.9 FL (ref 8.9–12.9)
POTASSIUM SERPL-SCNC: 4.2 MMOL/L (ref 3.5–5.1)
PROT SERPL-MCNC: 7 G/DL (ref 6.4–8.2)
RBC # BLD AUTO: 4.03 M/UL (ref 3.8–5.2)
SODIUM SERPL-SCNC: 142 MMOL/L (ref 136–145)
TRIGL SERPL-MCNC: 92 MG/DL (ref ?–150)
VLDLC SERPL CALC-MCNC: 18.4 MG/DL
WBC # BLD AUTO: 6.2 K/UL (ref 3.6–11)

## 2022-08-05 NOTE — PROGRESS NOTES
Dear Ms. Adam Seo,    I wanted to follow up on your recent test results: All labs are normal including sugar and cholesterol.

## 2022-08-15 ENCOUNTER — HOSPITAL ENCOUNTER (OUTPATIENT)
Dept: PHYSICAL THERAPY | Age: 27
Discharge: HOME | End: 2022-08-15
Payer: COMMERCIAL

## 2022-08-15 PROCEDURE — 97161 PT EVAL LOW COMPLEX 20 MIN: CPT | Performed by: PHYSICAL THERAPIST

## 2022-08-15 PROCEDURE — 97110 THERAPEUTIC EXERCISES: CPT | Performed by: PHYSICAL THERAPIST

## 2022-08-15 NOTE — PROGRESS NOTES
[facility]  [address]  [place], [address]  Phone: [phone]  Fax: [phone]    Plan of Care/Statement of Necessity for Physical Therapy Services  2-15    Patient name: Everette Donis  : 1995  Provider#: [id number]  Referral source: Carlos Dietz MD      Medical/Treatment Diagnosis: Sprain of unspecified ligament of left ankle, subsequent encounter [S93.402D]     Prior Hospitalization: see medical history     Comorbidities: see evaluation. Prior Level of Function: see evaluation. Medications: Verified on Patient Summary List    Start of Care: see evaluation. Onset Date: See evaluation       The Plan of Care and following information is based on the information from the initial evaluation. Assessment/ key information: Pt is a 25 yo female with increased left ankle pain after sprain that occurred during rugby practice 24. Pt with increased lateral > medial pain today although pt reports GENNA occurred with eversion motion. Pt reports x-rays at urgent care were negative. Pt showing decreased ROM, flexibility, increased TTP, decreased strength and decreased balance. Treatment Plan may include any combination of the following: Therapeutic exercise, Therapeutic activities, Neuromuscular re-education, Physical agent/modality, Gait/balance training, Manual therapy, Patient education, and Self Care training  Patient / Family readiness to learn indicated by: asking questions, trying to perform skills and interest  Persons(s) to be included in education: patient (P)  Barriers to Learning/Limitations: None  Patient Goal (s): see eval  Patient Self Reported Health Status: good  Rehabilitation Potential: good    Short Term Goals:  To be accomplished in 2-3 weeks:   1) Pt independent in HEP from day 1   2) Pt will improved ankle DF to 0-5 deg for improved ROM needed for running   3) Pt will improve SLS L LE to 30 sec or more with EO and EC for improved balance needed for sport     Long Term Goals: To be accomplished in 6-8 weeks:   1) Pt independent in final HEP. 2) Pt will be able to run / walk for 10 ' without increased pain. 3) Pt will be able to participate in rugby practice without increased pain   4) Pt will be able to perform x 5 single limb squat and x 5 step down from 6 \" step L LE without increased pain for improved strength / stability needed for rugby  5) Pt able to jump x 5 and hop x 5 without increased pain to be able to participate in rugby practice and games. Frequency / Duration: Patient to be seen 2 times per week for 6-8 weeks.     Patient/ Caregiver education and instruction: self care and exercises    [x]  Plan of care has been reviewed with JAY Duffy PT DPT, OCS 8/15/2022 9:02 AM

## 2022-08-15 NOTE — PROGRESS NOTES
[facility]  [address] [facility], [address]  Phone: [phone]  Fax: [phone]      PT INITIAL EVALUATION NOTE - [facility] 2-15    Patient Name: Marietta Fernández  Date:8/15/2022  : 1995  [x]  Patient  Verified  Payor: Sukhjinder French / Plan: [facility] [employer] HMO/CHOICE PLUS/POS / Product Type: HMO /    In time:905  Out time:1015  Total Treatment Time (min): 70  Total Timed Codes (min): 25  1:1 Treatment Time ( [address] only): 25   Visit #: 1     Treatment Area: Sprain of unspecified ligament of left ankle, subsequent encounter [S93.402D]     Subjective: Any medication changes, allergies to medications, adverse drug reactions, diagnosis change, or new procedure performed?: [] No    [x] Yes (see summary sheet for update):    Chief c/o:  left ankle sprain, pt describes eversion sprain. Date of onset/injury: 24. Pt reports sprained her left ankle playing rugby (reports left ankle everted) and continued to play after injury. After that day, she hasn't been able to play rugby again. Pt reports the swelling lasted for about 1 mo, bruising lasted for 1-2 weeks. Pt reports she did go to urgent care 3-4 weeks ago and had an x-ray and was told it was negative. She was told to go to PCP and go to PT. She had a telehealth visit with PCP and was referred to PT. Pain:   7/10 max 0/10 min 0/10 now but pt reports is \"tight\" with movement. Location and description of symptoms: lateral ankle pain > medial ankle pain left ankle. Current symptoms/chief complaint:     Aggravated by: walking on tippy toes, running and jumping. Eased by: rest.      PMH: Significant for asthma.  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       Pt reports hasn't been able to play rugby since injury. Pt reports she tried again 2 weeks ago but cont. To have pain. Pt reports when she tried to play 2 weeks ago she did have it taped by the [employer] and it was still painful running and then someone fell on it. 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  Pt reports she travels around to play in tournaments. Pt reports typically she would practice 2x/week, pt reports starting in September to mid October. Pt reports she played for [employer] as well. Prior level of function: prior to injury was participating in Rugby on club team / [employer] team, able to run without pain. Patient goal(s): \"get back to playing rugby\"        Objective:      Observation/posture: slight pes planus foot type with increased mid foot pronation (mild)    Gait: Mild decrease in push off on the LEFT during ambulation. Stairs:  decreased ankle DF on the LEFT noted. ROM/Strength        AROM  Strength (1-5)   Right Left Right Left   Dorsiflexion +5 -20 5 2   Plantarflexion nt nt 5 See below   Inversion 25 13 5 3+ poor coordination noted   Eversion 16 14 5 3+ poor coordination noted                                 Pt c/o of \"tightness\" with all testing of L ankle  Ratchet-like movement with ROM and strength testing L ankle  Heel raises:  B/L performs x 10, inc. Weight shift to the RIGHT  Heel raises unilateral R x 10 easily, L x 5 with flexion of left knee, decreased height at least 50%    Step down / tap down 6 \" step  R: performs x 3 with mild hip IR/ER / decreased stability  L: unable to perform x 1 , performed 25% ROM as compared to the RIGHT. Single limb squat:  R: performs x 3 easily but shows dec. Stab. At hip  L: decreased height (50%) as compared to R / decreased ankle DF. Palpation:  TTP:  Location: severe TTP posterior T.F. ligament, C.F. ligament > anterior T.F. ligament (LEFT)  Increased TTP medial malleolus diffusely.       Outcome Measure: Patient presents with an initial FOTO intake summary score of in chart. Swelling: no inc. In effusion / swelling noted left lateral or medial ankle as compared to right. Single Leg Balance/Stork Test: (R): 30 sec (L):25 sec, decreased stability noted. Today's Treatment:     25 min Therapeutic Exercise:  [x] See flow sheet : ankle t-band 4 way, strap calf stretch, standing gastroc and soleus stretch. Rationale: increase ROM, increase strength, improve coordination, improve balance, and increase proprioception to improve the patients ability to play rugby. Modalities:  CP to left ankle x 10 ' end of session.            With   [] TE   [] TA   [] neuro   [] other: Patient Education: [x] Review HEP    [] Progressed/Changed HEP based on:   [] positioning   [] body mechanics   [] transfers   [] heat/ice application    [] other:      Other Objective/Functional Measures:see above    Pain Level (0-10 scale) post treatment: 0    Assessment:   [x] See POC    Plan:   [x] See POC  [] Other:     Maximilian Daniel PT, DPT, OCS    8/15/2022  9:02 AM

## 2022-08-18 ENCOUNTER — HOSPITAL ENCOUNTER (OUTPATIENT)
Dept: PHYSICAL THERAPY | Age: 27
Discharge: HOME | End: 2022-08-18
Payer: COMMERCIAL

## 2022-08-18 PROCEDURE — 97140 MANUAL THERAPY 1/> REGIONS: CPT | Performed by: PHYSICAL THERAPIST

## 2022-08-18 PROCEDURE — 97110 THERAPEUTIC EXERCISES: CPT | Performed by: PHYSICAL THERAPIST

## 2022-08-18 NOTE — PROGRESS NOTES
PT DAILY TREATMENT NOTE - [facility] 2-15    Patient Name: Consuelo Wallis  Date:2022  : 1995  [x]  Patient  Verified  Payor: Peter Sheets / Plan: [address] HMO/CHOICE PLUS/POS / Product Type: HMO /    In time:430  Out time:525  Total Treatment Time (min): 55  Total Timed Codes (min): 55  1:1 Treatment Time ([address] only): 55   Visit #: 2    Treatment Area: Left ankle pain [M25.572]    SUBJECTIVE  Pain Level (0-10 scale), subjective functional status/changes: Pt reports a little bit of pain today (lateral left ankle), 1/10 pain. She has been doing her exercises at home. Has tried the elliptical but her heel would come up. Bike was fine. She is ready to get back to her sport. Any medication changes, allergies to medications, adverse drug reactions, diagnosis change, or new procedure performed?: [x] No    [] Yes (see summary sheet for update) SEE ABOVE. OBJECTIVE     Left ankle DF:  - 1 deg pre manual.  + 6 deg post manual.      Joint mobility:  L ankle: Hypomobile posterior talus mobs. Some pain with mobs. (Right WNL and no pain). SLS:  L: holds 30 \" but decreased M/L stability. At home min Modality:      []  Ice     []  Heat       Position/location:   -   Rationale: decrease pain to improve the patients ability to do functional activities   [x] Skin assessment post-treatment:  [x]intact []redness- no adverse reaction    []redness - adverse reaction:      45 min Therapeutic Exercise:  [x] See flow sheet : added BAPS (seated) level 3, step and hold 6\" step, side steps with t-band, SLS EO, clocks. 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 15 min Manual Therapy:  gentle TC distraction, posterior TC joint glides grade II and II, manual stretching of gastroc.      Rationale: decrease pain, increase tissue extensibility and decrease trigger points  to improve the patients ability to return to playing rugby. With   [] TE   [] TA   [] neuro   [] other: Patient Education: [x] Review HEP    [] Progressed/Changed HEP based on:   [] positioning   [] body mechanics   [] transfers   [] heat/ice application    [] other:        Pain Level (0-10 scale) post treatment: 0    ASSESSMENT/Changes in Function:   Pt with improved DF AROM since first visit and improved within session to +6 deg following manual therapy. Pt showing decreased stability (M/L at ankle) with static L LE SLS, will benefit from cont. PT. Patient will continue to benefit from skilled PT services to modify and progress therapeutic interventions, address functional mobility deficits, address ROM deficits, address strength deficits, analyze and address soft tissue restrictions, analyze and cue movement patterns, assess and modify postural abnormalities, address imbalance/dizziness, and instruct in home and community integration to attain remaining goals. Short Term Goals: To be accomplished in 2-3 weeks:              1) Pt independent in HEP from day 1 MET               2) Pt will improved ankle DF to 0-5 deg for improved ROM needed for running progressing. 3) Pt will improve SLS L LE to 30 sec or more with EO and EC for improved balance needed for sport progressing. Long Term Goals: To be accomplished in 6-8 weeks:              1) Pt independent in final HEP. 2) Pt will be able to run / walk for 10 ' without increased pain. 3) Pt will be able to participate in rugby practice without increased pain              4) Pt will be able to perform x 5 single limb squat and x 5 step down from 6 \" step L LE without increased pain for improved strength / stability needed for rugby  5) Pt able to jump x 5 and hop x 5 without increased pain to be able to participate in rugby practice and games.        PLAN      [x]  Continue plan of care    []  Other:_ Valentino Ta, PT DPT, [facility] 8/18/2022  1:91 PM       PT License #[id number]

## 2022-08-23 ENCOUNTER — HOSPITAL ENCOUNTER (OUTPATIENT)
Dept: PHYSICAL THERAPY | Age: 27
Discharge: HOME | End: 2022-08-23
Payer: COMMERCIAL

## 2022-08-23 PROCEDURE — 97140 MANUAL THERAPY 1/> REGIONS: CPT | Performed by: PHYSICAL THERAPIST

## 2022-08-23 PROCEDURE — 97110 THERAPEUTIC EXERCISES: CPT | Performed by: PHYSICAL THERAPIST

## 2022-08-23 NOTE — PROGRESS NOTES
PT DAILY TREATMENT NOTE - [facility] 2-15    Patient Name: Caroline Calvillo  Date:2022  : 1995  [x]  Patient  Verified  Payor: Osmany Palomares / Plan: [employer] HMO/CHOICE PLUS/POS / Product Type: HMO /    In time:930  Out time: 1030   Total Treatment Time (min): 50  Total Timed Codes (min): 50  1:1 Treatment Time ( [address] only): 50    Visit #: 3    Treatment Area: Left ankle pain [M25.572]    SUBJECTIVE  Pain Level (0-10 scale), subjective functional status/changes: Pt reports 0/10 pain today. Pt reports she has had some mild pain with t-band ankle PF. First practice of rugby is today, she plans to go and stand on the sidelines. Any medication changes, allergies to medications, adverse drug reactions, diagnosis change, or new procedure performed?: [x] No    [] Yes (see summary sheet for update) SEE ABOVE. OBJECTIVE   ROM: L ankle DF approx + 5 deg following stationary bike    10 '  min Modality:      [x]  Ice     []  Heat       Position/location:   left ankle. Rationale: decrease pain to improve the patients ability to do functional activities   [x] Skin assessment post-treatment:  [x]intact []redness- no adverse reaction    []redness - adverse reaction:      40 min Therapeutic Exercise:  [x] See flow sheet : cont. With BAPS (seated) level 3, step and hold 6\" step, side steps with t-band, SLS EO, clocks. 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 10 min Manual Therapy:  gentle TC distraction, posterior TC joint glides grade II and II, manual stretching of gastroc. Rationale: decrease pain, increase tissue extensibility and decrease trigger points  to improve the patients ability to return to playing rugby.             With   [] TE   [] TA   [] neuro   [] other: Patient Education: [x] Review HEP    [] Progressed/Changed HEP based on:   [] positioning   [] body mechanics   [] transfers   [] heat/ice application    [] other:        Pain Level (0-10 scale) post treatment: 0    ASSESSMENT/Changes in Function:   Pt with improved DF ROM and improved stability with L SLS with EO. Progressed to SLS with head turns, SLS on [employer] foam and other ther. Ex. Per flow sheet. Patient will continue to benefit from skilled PT services to modify and progress therapeutic interventions, address functional mobility deficits, address ROM deficits, address strength deficits, analyze and address soft tissue restrictions, analyze and cue movement patterns, assess and modify postural abnormalities, address imbalance/dizziness, and instruct in home and community integration to attain remaining goals. Short Term Goals: To be accomplished in 2-3 weeks:              1) Pt independent in HEP from day 1 MET               2) Pt will improved ankle DF to 0-5 deg for improved ROM needed for running progressing. MET               3) Pt will improve SLS L LE to 30 sec or more with EO and EC for improved balance needed for sport progressing. Long Term Goals: To be accomplished in 6-8 weeks:              1) Pt independent in final HEP. 2) Pt will be able to run / walk for 10 ' without increased pain. 3) Pt will be able to participate in rugby practice without increased pain              4) Pt will be able to perform x 5 single limb squat and x 5 step down from 6 \" step L LE without increased pain for improved strength / stability needed for rugby  5) Pt able to jump x 5 and hop x 5 without increased pain to be able to participate in rugby practice and games.        PLAN      [x]  Continue plan of care    []  Other:_      Kody Macias, PT DPT, [facility] 8/23/2022  9:50 PM       PT License #[id number]

## 2022-08-25 ENCOUNTER — HOSPITAL ENCOUNTER (OUTPATIENT)
Dept: PHYSICAL THERAPY | Age: 27
Discharge: HOME | End: 2022-08-25
Payer: COMMERCIAL

## 2022-08-25 PROCEDURE — 97140 MANUAL THERAPY 1/> REGIONS: CPT | Performed by: PHYSICAL THERAPIST

## 2022-08-25 PROCEDURE — 97110 THERAPEUTIC EXERCISES: CPT | Performed by: PHYSICAL THERAPIST

## 2022-08-25 NOTE — PROGRESS NOTES
PT DAILY TREATMENT NOTE - [facility] 2-15    Patient Name: Everette Donis  Date:2022  : 1995  [x]  Patient  Verified  Payor: Raheem Banks / Plan: [id number] HMO/CHOICE PLUS/POS / Product Type: HMO /    In time:500  Out time: 605   Total Treatment Time (min):65   Total Timed Codes (min): 55  1:1 Treatment Time [facility] only):55     Visit #: 4    Treatment Area: Left ankle pain [M25.572]    SUBJECTIVE  Pain Level (0-10 scale), subjective functional status/changes: Pt reports 0/10 pain today. Pt reports still has pain with end range ankle PF AROM and inversion AROM. Pancho Quinonez she has limited to pain free range with t-band. She was able to do 20 lb squats at the gym. Any medication changes, allergies to medications, adverse drug reactions, diagnosis change, or new procedure performed?: [x] No    [] Yes (see summary sheet for update) SEE ABOVE. OBJECTIVE     AROM:  left ankle PF and inversion AROM mild pain at end range. SLS squat and step down: decreased stab. On the LEFT versus right but improved from day 1    10 '  min Modality:      [x]  Ice     []  Heat       Position/location:   left ankle. Rationale: decrease pain to improve the patients ability to do functional activities   [x] Skin assessment post-treatment:  [x]intact []redness- no adverse reaction    []redness - adverse reaction:      45 min Therapeutic Exercise:  [x] See flow sheet : added extra tap with clocks. Added stab. Kicks (green t-band)  Added rebounder. Added monster walk yellow infinity band. Circles with BAPS (seated). 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 10 min Manual Therapy:  gentle TC distraction, posterior TC joint glides grade II and II,   Rationale: decrease pain, increase tissue extensibility and decrease trigger points  to improve the patients ability to return to playing rugby. With   [] TE   [] TA   [] neuro   [] other: Patient Education: [x] Review HEP    [] Progressed/Changed HEP based on:   [] positioning   [] body mechanics   [] transfers   [] heat/ice application    [] other:        Pain Level (0-10 scale) post treatment: 0    ASSESSMENT/Changes in Function:   Pt showing improved stabilization in SLS throughout session today. With re-test of ankle PF, inversion she is still having mild pain at end range (AROM) and still shows instability with SLS squat and step down on the left versus the right. She has been able to progress at each session. She will cont. To benefit from PT to return to playing rugby. Patient will continue to benefit from skilled PT services to modify and progress therapeutic interventions, address functional mobility deficits, address ROM deficits, address strength deficits, analyze and address soft tissue restrictions, analyze and cue movement patterns, assess and modify postural abnormalities, address imbalance/dizziness, and instruct in home and community integration to attain remaining goals. Short Term Goals: To be accomplished in 2-3 weeks:              1) Pt independent in HEP from day 1 MET               2) Pt will improved ankle DF to 0-5 deg for improved ROM needed for running progressing. MET               3) Pt will improve SLS L LE to 30 sec or more with EO and EC for improved balance needed for sport progressing. Long Term Goals: To be accomplished in 6-8 weeks:              1) Pt independent in final HEP. 2) Pt will be able to run / walk for 10 ' without increased pain.                3) Pt will be able to participate in rugby practice without increased pain              4) Pt will be able to perform x 5 single limb squat and x 5 step down from 6 \" step L LE without increased pain for improved strength / stability needed for rugby  5) Pt able to jump x 5 and hop x 5 without increased pain to be able to participate in rugby practice and games.        PLAN      [x]  Continue plan of care    []  Other:_      Gretchen Montelongo, PT DPT, [facility] 8/25/2022  9:24 PM       PT License #[id number]

## 2022-08-30 ENCOUNTER — HOSPITAL ENCOUNTER (OUTPATIENT)
Dept: PHYSICAL THERAPY | Age: 27
Discharge: HOME | End: 2022-08-30
Payer: COMMERCIAL

## 2022-08-30 PROCEDURE — 97016 VASOPNEUMATIC DEVICE THERAPY: CPT | Performed by: PHYSICAL THERAPY ASSISTANT

## 2022-08-30 PROCEDURE — 97140 MANUAL THERAPY 1/> REGIONS: CPT | Performed by: PHYSICAL THERAPY ASSISTANT

## 2022-08-30 PROCEDURE — 97110 THERAPEUTIC EXERCISES: CPT | Performed by: PHYSICAL THERAPY ASSISTANT

## 2022-08-30 NOTE — PROGRESS NOTES
PT DAILY TREATMENT NOTE - [facility] 2-15    Patient Name: Tim Richardson  Date:2022  : 1995  [x]  Patient  Verified  Payor: Arlyn Wu / Plan: [address] HMO/CHOICE PLUS/POS / Product Type: HMO /    In time: 8:00 AM  Out time: 9:05 AM  Total Treatment Time (min):65   Total Timed Codes (min): 55  1:1 Treatment Time [facility] only):55     Visit #: 5    Treatment Area: Left ankle pain [M25.572]    SUBJECTIVE  Pain Level (0-10 scale), subjective functional status/changes: Pt reports 2/10 today, states she went dancing over the weekend and walked about 7 miles. She did apply ice but didn't feel the needs to take medication. States she is weightlifting 35 pounds for goblet squats but in general hasn't had any pain during weight lifting. Any medication changes, allergies to medications, adverse drug reactions, diagnosis change, or new procedure performed?: [x] No    [] Yes (see summary sheet for update) SEE ABOVE. OBJECTIVE       10 '  min Modality:  vasopneumatic compression with  moderate compression at 34 degrees    []  Ice     []  Heat       Position/location:   left ankle. Rationale: decrease pain to improve the patients ability to do functional activities   [x] Skin assessment post-treatment:  [x]intact []redness- no adverse reaction    []redness - adverse reaction:      45 min Therapeutic Exercise:  [x] See flow sheet : added extra tap with clocks. Added stab. Kicks (green t-band)  Added rebounder. Added monster walk yellow infinity band. Circles with BAPS (seated). 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 10 min Manual Therapy:  gentle TC distraction, posterior TC joint glides grade II and II,   Rationale: decrease pain, increase tissue extensibility and decrease trigger points  to improve the patients ability to return to playing rugby.             With   [] TE [] TA   [] neuro   [] other: Patient Education: [x] Review HEP    [] Progressed/Changed HEP based on:   [] positioning   [] body mechanics   [] transfers   [] heat/ice application    [] other:        Pain Level (0-10 scale) post treatment: 0    ASSESSMENT/Changes in Function:   Patient with slight swelling L ankle but able to perform exercises without aggravation of symptoms. Minor cues to maintain neutral L LE alignment during SL balance/proprioceptive exercises. Patient will continue to benefit from skilled PT services to modify and progress therapeutic interventions, address functional mobility deficits, address ROM deficits, address strength deficits, analyze and address soft tissue restrictions, analyze and cue movement patterns, assess and modify postural abnormalities, address imbalance/dizziness, and instruct in home and community integration to attain remaining goals. Short Term Goals: To be accomplished in 2-3 weeks:              1) Pt independent in HEP from day 1 MET               2) Pt will improved ankle DF to 0-5 deg for improved ROM needed for running progressing. MET               3) Pt will improve SLS L LE to 30 sec or more with EO and EC for improved balance needed for sport progressing. Long Term Goals: To be accomplished in 6-8 weeks:              1) Pt independent in final HEP. 2) Pt will be able to run / walk for 10 ' without increased pain. 3) Pt will be able to participate in rugby practice without increased pain              4) Pt will be able to perform x 5 single limb squat and x 5 step down from 6 \" step L LE without increased pain for improved strength / stability needed for rugby  5) Pt able to jump x 5 and hop x 5 without increased pain to be able to participate in rugby practice and games.        PLAN      [x]  Continue plan of care    []  Other:_      Thyra Raw, PTA  8/30/2022

## 2022-09-01 ENCOUNTER — HOSPITAL ENCOUNTER (OUTPATIENT)
Dept: PHYSICAL THERAPY | Age: 27
Discharge: HOME | End: 2022-09-01
Payer: COMMERCIAL

## 2022-09-01 PROCEDURE — 97110 THERAPEUTIC EXERCISES: CPT | Performed by: PHYSICAL THERAPIST

## 2022-09-01 PROCEDURE — 97140 MANUAL THERAPY 1/> REGIONS: CPT | Performed by: PHYSICAL THERAPIST

## 2022-09-01 NOTE — PROGRESS NOTES
PT DAILY TREATMENT NOTE - [facility] 2-15    Patient Name: Sendy Lipbenny  Date:2022  : 1995  [x]  Patient  Verified  Payor: Diana Vogel / Plan: [employer] HMO/CHOICE PLUS/POS / Product Type: HMO /    In time: 7662 PM  Out time: 140 PM  Total Treatment Time (min):70  Total Timed Codes (min): 60  1:1 Treatment Time ( [address] only):     Visit #: 6    Treatment Area: Left ankle pain [M25.572]    SUBJECTIVE  Pain Level (0-10 scale), subjective functional status/changes: Pt reports 0/10 today. Pt reports still has pain with end range AROM PF and inversion but feels like she can go further. She did go to the gym today and did some strength training. She did walk 7 miles the other day and was wearing birkenstocks but felt better after last session with PTA. Any medication changes, allergies to medications, adverse drug reactions, diagnosis change, or new procedure performed?: [x] No    [] Yes (see summary sheet for update) SEE ABOVE. OBJECTIVE       Balance:  able to hold 18 sec with EC now. Hop: inc. Pain. Jump : 1 rep no pain (but x 10 ecc. Jumps inc. Pain mildly). 10 '  min Modality:  cold pack     []  Ice     []  Heat       Position/location:   left ankle. Rationale: decrease pain to improve the patients ability to do functional activities   [x] Skin assessment post-treatment:  [x]intact []redness- no adverse reaction    []redness - adverse reaction:      50 min Therapeutic Exercise:  [x] See flow sheet :  progressed BAPS to standing (level 2)  Agility ladder : inc. In speed. Trial with 10 eccentric plyo jump (slight inc. 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       10 min Manual Therapy:  gentle TC distraction, posterior TC joint glides grade II and II,  Contract / relax for inc. Pain free ankle inversion   Rationale: decrease pain, increase tissue extensibility and decrease trigger points  to improve the patients ability to return to playing rugby. With   [] TE   [] TA   [] neuro   [] other: Patient Education: [x] Review HEP    [] Progressed/Changed HEP based on:   [] positioning   [] body mechanics   [] transfers   [] heat/ice application    [] other:        Pain Level (0-10 scale) post treatment: 0    ASSESSMENT/Changes in Function:   Pt making good progress, improving in balance and agility drills. She is still not ready for return to sport, will benefit from cont. PT. Patient will continue to benefit from skilled PT services to modify and progress therapeutic interventions, address functional mobility deficits, address ROM deficits, address strength deficits, analyze and address soft tissue restrictions, analyze and cue movement patterns, assess and modify postural abnormalities, address imbalance/dizziness, and instruct in home and community integration to attain remaining goals. Short Term Goals: To be accomplished in 2-3 weeks:              1) Pt independent in HEP from day 1 MET               2) Pt will improved ankle DF to 0-5 deg for improved ROM needed for running progressing. MET               3) Pt will improve SLS L LE to 30 sec or more with EO and EC for improved balance needed for sport progressing. Long Term Goals: To be accomplished in 6-8 weeks:              1) Pt independent in final HEP. 2) Pt will be able to run / walk for 10 ' without increased pain. 3) Pt will be able to participate in rugby practice without increased pain              4) Pt will be able to perform x 5 single limb squat and x 5 step down from 6 \" step L LE without increased pain for improved strength / stability needed for rugby  5) Pt able to jump x 5 and hop x 5 without increased pain to be able to participate in rugby practice and games.        PLAN      [x]  Continue plan of care    [] Other:_      Khurram Fonseca, PT  9/1/2022

## 2022-09-06 ENCOUNTER — HOSPITAL ENCOUNTER (OUTPATIENT)
Dept: PHYSICAL THERAPY | Age: 27
Discharge: HOME | End: 2022-09-06
Payer: COMMERCIAL

## 2022-09-06 PROCEDURE — 97110 THERAPEUTIC EXERCISES: CPT | Performed by: PHYSICAL THERAPIST

## 2022-09-06 PROCEDURE — 97140 MANUAL THERAPY 1/> REGIONS: CPT | Performed by: PHYSICAL THERAPIST

## 2022-09-06 NOTE — PROGRESS NOTES
PT DAILY TREATMENT NOTE - [facility] 2-15    Patient Name: Conchis Díaz  Date:2022  : 1995  [x]  Patient  Verified  Payor: Breana Mccullough / Plan: [employer] HMO/CHOICE PLUS/POS / Product Type: HMO /    In time: 100 PM  Out time: 205 PM  Total Treatment Time (min):65  Total Timed Codes (min): 55  1:1 Treatment Time [facility] only):55     Visit #: 7    Treatment Area: Left ankle pain [M25.572]    SUBJECTIVE  Pain Level (0-10 scale), subjective functional status/changes: Pt reports 0/10 today. Pt reports some pain in her left ankle after going to a concert in [place] over the weekend, she was standing for about 6 hours. Other than that, 0/10 ankle pain, still a little uncomfortable with ankle PF and inversion. Any medication changes, allergies to medications, adverse drug reactions, diagnosis change, or new procedure performed?: [x] No    [] Yes (see summary sheet for update) SEE ABOVE. OBJECTIVE     Strength:  able to perfrom x 10 ankle raise on the LEFT but notes fatigue / inc. Cramping on the LEFT (pt performed x 10 on the right easily without symptoms). Gait: running: pt able to run without inc. Pain, equal weight shift, increased translation superior. 10 '  min Modality:  cold pack     []  Ice     []  Heat       Position/location:   left ankle. Rationale: decrease pain to improve the patients ability to do functional activities   [x] Skin assessment post-treatment:  [x]intact []redness- no adverse reaction    []redness - adverse reaction:      45 min Therapeutic Exercise:  [x] See flow sheet :  progressed BAPS to standing (level 3)  Agility ladder : inc. In speed. 10 eccentric plyo jump 2x10  (slight inc. Pain after)    Running:  15-20 \" inc. Cues to decrease translation superior and cues for slight anterior lean. 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       10 min Manual Therapy:  gentle TC distraction, posterior TC joint glides grade II and II,  Contract / relax for inc. Pain free ankle inversion   Rationale: decrease pain, increase tissue extensibility and decrease trigger points  to improve the patients ability to return to playing rugby. With   [] TE   [] TA   [] neuro   [] other: Patient Education: [x] Review HEP    [] Progressed/Changed HEP based on:   [] positioning   [] body mechanics   [] transfers   [] heat/ice application    [] other:        Pain Level (0-10 scale) post treatment: 0    ASSESSMENT/Changes in Function:   Pt did well with plyometric jumps today (2x10) without any pain. She does show slight lag with concentric contraction into left ankle eversion in lengthened position on the left as compared to the right. Pt also showing decreased strength L ankle PF as compared to right. Overall, she has been showing good progress and she was able to run 15-20 \" today without inc. Symptoms. Gave OK to start 6-8 15-20 sec running strides on level surface every other day. Patient will continue to benefit from skilled PT services to modify and progress therapeutic interventions, address functional mobility deficits, address ROM deficits, address strength deficits, analyze and address soft tissue restrictions, analyze and cue movement patterns, assess and modify postural abnormalities, address imbalance/dizziness, and instruct in home and community integration to attain remaining goals. Short Term Goals: To be accomplished in 2-3 weeks:              1) Pt independent in HEP from day 1 MET               2) Pt will improved ankle DF to 0-5 deg for improved ROM needed for running progressing. MET               3) Pt will improve SLS L LE to 30 sec or more with EO and EC for improved balance needed for sport progressing. Long Term Goals: To be accomplished in 6-8 weeks:              1) Pt independent in final HEP. 2) Pt will be able to run / walk for 10 ' without increased pain. 3) Pt will be able to participate in rugby practice without increased pain              4) Pt will be able to perform x 5 single limb squat and x 5 step down from 6 \" step L LE without increased pain for improved strength / stability needed for rugby  5) Pt able to jump x 5 and hop x 5 without increased pain to be able to participate in rugby practice and games.        PLAN      [x]  Continue plan of care    []  Other:_      Edwin Jose, PT  9/6/2022

## 2022-09-08 ENCOUNTER — HOSPITAL ENCOUNTER (OUTPATIENT)
Dept: PHYSICAL THERAPY | Age: 27
Discharge: HOME | End: 2022-09-08
Payer: COMMERCIAL

## 2022-09-08 PROCEDURE — 97110 THERAPEUTIC EXERCISES: CPT | Performed by: PHYSICAL THERAPIST

## 2022-09-08 PROCEDURE — 97140 MANUAL THERAPY 1/> REGIONS: CPT | Performed by: PHYSICAL THERAPIST

## 2022-09-08 NOTE — PROGRESS NOTES
PT DAILY TREATMENT NOTE - [facility] 2-15    Patient Name: Dangelo Hillman  Date:2022  : 1995  [x]  Patient  Verified  Payor: Bairon Richi / Plan: [employer] HMO/CHOICE PLUS/POS / Product Type: HMO /    In time: 1013 AM  Out time: 3159 PM  Total Treatment Time (min):60  Total Timed Codes (min): 50  1:1 Treatment Time [facility]):n/a     Visit #: 8    Treatment Area: Left ankle pain [M25.572]    SUBJECTIVE  Pain Level (0-10 scale), subjective functional status/changes: Pt reports 0/10 today. Pt reports she did try the running strides yesterday but had some ankle pain so she stopped. Otherwise no residual pain after PT or after the running strides. Any medication changes, allergies to medications, adverse drug reactions, diagnosis change, or new procedure performed?: [x] No    [] Yes (see summary sheet for update) SEE ABOVE. OBJECTIVE       10 '  min Modality:  cold pack     []  Ice     []  Heat       Position/location:   left ankle. 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 10 min Manual Therapy:  gentle TC distraction, posterior TC joint glides grade II and II,  Contract / relax for inc. Pain free ankle inversion   Rationale: decrease pain, increase tissue extensibility and decrease trigger points  to improve the patients ability to return to playing rugby.             With   [] TE   [] TA   [] neuro   [] other: Patient Education: [x] Review HEP    [] Progressed/Changed HEP based on:   [] positioning   [] body mechanics   [] transfers   [] heat/ice application    [] other: Pain Level (0-10 scale) post treatment: 0    ASSESSMENT/Changes in Function:   Pt with inc. Pain with trial of 15-20 \" running strides yesterday but may be due to lack of sufficient warm up or rest day before trying. Advised to trial again on Saturday. Patient will continue to benefit from skilled PT services to modify and progress therapeutic interventions, address functional mobility deficits, address ROM deficits, address strength deficits, analyze and address soft tissue restrictions, analyze and cue movement patterns, assess and modify postural abnormalities, address imbalance/dizziness, and instruct in home and community integration to attain remaining goals. Short Term Goals: To be accomplished in 2-3 weeks:              1) Pt independent in HEP from day 1 MET               2) Pt will improved ankle DF to 0-5 deg for improved ROM needed for running progressing. MET               3) Pt will improve SLS L LE to 30 sec or more with EO and EC for improved balance needed for sport progressing. Long Term Goals: To be accomplished in 6-8 weeks:              1) Pt independent in final HEP. 2) Pt will be able to run / walk for 10 ' without increased pain. 3) Pt will be able to participate in rugby practice without increased pain              4) Pt will be able to perform x 5 single limb squat and x 5 step down from 6 \" step L LE without increased pain for improved strength / stability needed for rugby  5) Pt able to jump x 5 and hop x 5 without increased pain to be able to participate in rugby practice and games.        PLAN      [x]  Continue plan of care    []  Other:_      Leonor Ovalle, PT  9/8/2022

## 2022-09-13 ENCOUNTER — HOSPITAL ENCOUNTER (OUTPATIENT)
Dept: PHYSICAL THERAPY | Age: 27
Discharge: HOME | End: 2022-09-13
Payer: COMMERCIAL

## 2022-09-13 PROCEDURE — 97140 MANUAL THERAPY 1/> REGIONS: CPT | Performed by: PHYSICAL THERAPY ASSISTANT

## 2022-09-13 PROCEDURE — 97110 THERAPEUTIC EXERCISES: CPT | Performed by: PHYSICAL THERAPY ASSISTANT

## 2022-09-13 NOTE — PROGRESS NOTES
PT DAILY TREATMENT NOTE - [facility] 2-15    Patient Name: Julie Krabbe  Date:2022  : 1995  [x]  Patient  Verified  Payor: Len Jacobdoug / Plan: [employer] HMO/CHOICE PLUS/POS / Product Type: HMO /    In time: 8:30 AM  Out time: 9:20 AM  Total Treatment Time (min):50  Total Timed Codes (min): 50  1:1 Treatment Time [facility]):n/a     Visit #: 9    Treatment Area: Left ankle pain [M25.572]    SUBJECTIVE  Pain Level (0-10 scale), subjective functional status/changes: Pt reports 0/10 today. Pt reports she did try the running strides yesterday and didn't have any pain afterwards. Any medication changes, allergies to medications, adverse drug reactions, diagnosis change, or new procedure performed?: [x] No    [] Yes (see summary sheet for update) SEE ABOVE. OBJECTIVE       At home'  min Modality:  cold pack     []  Ice     []  Heat       Position/location:   left ankle. 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 10 min Manual Therapy:  gentle TC distraction, posterior TC joint glides grade II and II,  Contract / relax for inc. Pain free ankle inversion   Rationale: decrease pain, increase tissue extensibility and decrease trigger points  to improve the patients ability to return to playing rugby.             With   [] TE   [] TA   [] neuro   [] other: Patient Education: [x] Review HEP    [] Progressed/Changed HEP based on:   [] positioning   [] body mechanics   [] transfers   [] heat/ice application    [] other:        Pain Level (0-10 scale) post treatment: 0    ASSESSMENT/Changes in Function:   Able to perform running strides at home pain free. Greatest difficulty during 3 cone lunges, progressing well with balance/proprioception and decreased pain overall with dynamic/agility activities. Patient will continue to benefit from skilled PT services to modify and progress therapeutic interventions, address functional mobility deficits, address ROM deficits, address strength deficits, analyze and address soft tissue restrictions, analyze and cue movement patterns, assess and modify postural abnormalities, address imbalance/dizziness, and instruct in home and community integration to attain remaining goals. Short Term Goals: To be accomplished in 2-3 weeks:              1) Pt independent in HEP from day 1 MET               2) Pt will improved ankle DF to 0-5 deg for improved ROM needed for running progressing. MET               3) Pt will improve SLS L LE to 30 sec or more with EO and EC for improved balance needed for sport progressing. Long Term Goals: To be accomplished in 6-8 weeks:              1) Pt independent in final HEP. 2) Pt will be able to run / walk for 10 ' without increased pain. 3) Pt will be able to participate in rugby practice without increased pain              4) Pt will be able to perform x 5 single limb squat and x 5 step down from 6 \" step L LE without increased pain for improved strength / stability needed for rugby  5) Pt able to jump x 5 and hop x 5 without increased pain to be able to participate in rugby practice and games.        PLAN      [x]  Continue plan of care    []  Other:_      Anel Cha, PTA  9/13/2022

## 2022-09-15 ENCOUNTER — HOSPITAL ENCOUNTER (OUTPATIENT)
Dept: PHYSICAL THERAPY | Age: 27
Discharge: HOME | End: 2022-09-15
Payer: COMMERCIAL

## 2022-09-15 PROCEDURE — 97140 MANUAL THERAPY 1/> REGIONS: CPT | Performed by: PHYSICAL THERAPIST

## 2022-09-15 PROCEDURE — 97110 THERAPEUTIC EXERCISES: CPT | Performed by: PHYSICAL THERAPIST

## 2022-09-15 NOTE — PROGRESS NOTES
PT DAILY TREATMENT NOTE - [facility] 2-15    Patient Name: Yakov Sero  Date:9/15/2022  : 1995  [x]  Patient  Verified  Payor: Blanca Lamb / Plan: [facility] HMO/CHOICE PLUS/POS / Product Type: HMO /    In time: 600 PM  Out time: 0 M  Total Treatment Time (min):60  Total Timed Codes (min): 50  1:1 Treatment Time [facility]):n/a     Visit #: 10    Treatment Area: Left ankle pain [M25.572]    SUBJECTIVE  Pain Level (0-10 scale), subjective functional status/changes: Pt reports 0/10 today. Pt reports has not had any pain since last PT visit. Any medication changes, allergies to medications, adverse drug reactions, diagnosis change, or new procedure performed?: [x] No    [] Yes (see summary sheet for update) SEE ABOVE. OBJECTIVE       10' min Modality:  cold pack     [x]  Ice     []  Heat       Position/location:   left ankle. Rationale: decrease pain to improve the patients ability to do functional activities   [x] Skin assessment post-treatment:  [x]intact []redness- no adverse reaction    []redness - adverse reaction:      40 min Therapeutic Exercise:  [x] See flow sheet :     4 cones in square pattern, run forward lateral slide diagonal x 30 sec    Same as above but plant foot and return on the diagonal.      Progressed to 1/2 foam on steam boats    AIREX foam on cone taps    Throw / catch with PT with throws at random for inc. 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 10 min Manual Therapy:  gentle TC distraction, posterior TC joint glides grade II and II,  Contract / relax for inc. Pain free ankle inversion   Rationale: decrease pain, increase tissue extensibility and decrease trigger points  to improve the patients ability to return to playing rugby.             With   [] TE   [] TA   [] neuro   [] other: Patient Education: [x] Review HEP    [] Progressed/Changed HEP based on:   [] positioning   [] body mechanics   [] transfers   [] heat/ice application    [] other:        Pain Level (0-10 scale) post treatment: 0    ASSESSMENT/Changes in Function:   Pt making excellent progress, able to run diagonals in clinic without pain. Pt ed. To progress at home and perform on the weekend along with 8 running strides. Patient will continue to benefit from skilled PT services to modify and progress therapeutic interventions, address functional mobility deficits, address ROM deficits, address strength deficits, analyze and address soft tissue restrictions, analyze and cue movement patterns, assess and modify postural abnormalities, address imbalance/dizziness, and instruct in home and community integration to attain remaining goals. Short Term Goals: To be accomplished in 2-3 weeks:              1) Pt independent in HEP from day 1 MET               2) Pt will improved ankle DF to 0-5 deg for improved ROM needed for running progressing. MET               3) Pt will improve SLS L LE to 30 sec or more with EO and EC for improved balance needed for sport progressing. Long Term Goals: To be accomplished in 6-8 weeks:              1) Pt independent in final HEP. 2) Pt will be able to run / walk for 10 ' without increased pain. 3) Pt will be able to participate in rugby practice without increased pain              4) Pt will be able to perform x 5 single limb squat and x 5 step down from 6 \" step L LE without increased pain for improved strength / stability needed for rugby  5) Pt able to jump x 5 and hop x 5 without increased pain to be able to participate in rugby practice and games.        PLAN      [x]  Continue plan of care    []  Other:_      Maximilian Daniel, PT  9/15/2022

## 2022-09-20 ENCOUNTER — HOSPITAL ENCOUNTER (OUTPATIENT)
Dept: PHYSICAL THERAPY | Age: 27
Discharge: HOME | End: 2022-09-20
Payer: COMMERCIAL

## 2022-09-20 PROCEDURE — 97110 THERAPEUTIC EXERCISES: CPT | Performed by: PHYSICAL THERAPIST

## 2022-09-20 NOTE — PROGRESS NOTES
PT Re-eval / DAILY TREATMENT NOTE - [facility] 2-15    Patient Name: Joseph Neves  Date:2022  : 1995  [x]  Patient  Verified  Payor: Chilango Bragg / Plan: [employer] HMO/CHOICE PLUS/POS / Product Type: HMO /    In time: 6437 PM  Out time: 125  PM  Total Treatment Time (min):55  Total Timed Codes (min): 45  1:1 Treatment Time [facility]):n/a     Visit #: 11    Treatment Area: Left ankle pain [M25.572]    SUBJECTIVE    Pain Level (0-10 scale), subjective functional status/changes: Pt reports 0/10 today. Pt reports she was able to do the running with diagonals on a grassy field without increased symptoms. Pt reports 0/10 pain after last visit and after running diagonals. Any medication changes, allergies to medications, adverse drug reactions, diagnosis change, or new procedure performed?: [x] No    [] Yes (see summary sheet for update) SEE ABOVE. OBJECTIVE     Strength:     Unilateral squat and step down from 6 inch step: Pt able to perform without pain and good ankle mobility L (= to right). Hopping:     Balance:  Able to perform ball throw to rebounder on 1/2 foam x 10 reps without loss of balance. Palpation:  no TTP ATF, mild TTP PTF ligament, no TTP CF ligament. ROM: ankle DF + 10 deg without increased pain. 10' min Modality:  cold pack     [x]  Ice     []  Heat       Position/location:   left ankle. Rationale: decrease pain to improve the patients ability to do functional activities   [x] Skin assessment post-treatment:  [x]intact []redness- no adverse reaction    []redness - adverse reaction:      45 min Therapeutic Exercise:  [x] See flow sheet : Throw / catch using rugby ball with PT with throws at random for inc. Challenge for 3x10. Lateral slides 15 ft 30-45 sec x 2 reps with throw catch with PT with rugby ball.      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 0 min Manual Therapy:     Rationale: decrease pain, increase tissue extensibility and decrease trigger points  to improve the patients ability to return to playing rugby. With   [] TE   [] TA   [] neuro   [] other: Patient Education: [x] Review HEP    [] Progressed/Changed HEP based on:   [] positioning   [] body mechanics   [] transfers   [] heat/ice application    [] other:      Pain Level (0-10 scale) post treatment: 0    ASSESSMENT/Changes in Function:   Pt with 11 skilled PT sessions from 08/15 through 09/20. Pt showing improved ankle ROM, improved strength and improved balance. We discussed resuming rugby practice but starting with non-contact. Pt in agreement that this is the best plan and also has a lace up ankle brace she will use. Continue PT to meet remaining goals. Patient will continue to benefit from skilled PT services to modify and progress therapeutic interventions, address functional mobility deficits, address ROM deficits, address strength deficits, analyze and address soft tissue restrictions, analyze and cue movement patterns, assess and modify postural abnormalities, address imbalance/dizziness, and instruct in home and community integration to attain remaining goals. Short Term Goals: To be accomplished in 2-3 weeks:              1) Pt independent in HEP from day 1 MET               2) Pt will improved ankle DF to 0-5 deg for improved ROM needed for running progressing. MET               3) Pt will improve SLS L LE to 30 sec or more with EO and EC for improved balance needed for sport partially met. Long Term Goals: To be accomplished in 6-8 weeks:              1) Pt independent in final HEP. Progressing. 2) Pt will be able to run / walk for 10 ' without increased pain. progressing              3) Pt will be able to participate in rugby practice without increased pain progressing.                4) Pt will be able to perform x 5 single limb squat and x 5 step down from 6 \" step L LE without increased pain for improved strength / stability needed for rugby  MET  5) Pt able to jump x 5 and hop x 5 without increased pain to be able to participate in rugby practice and games. MET     PLAN      [x]  Continue plan of care    [x]  Other:_ cont. 1x/week 4 weeks from re-eval 09/20.       Aris Osgood, PT  9/20/2022

## 2022-09-22 ENCOUNTER — APPOINTMENT (OUTPATIENT)
Dept: PHYSICAL THERAPY | Age: 27
End: 2022-09-22
Payer: COMMERCIAL

## 2022-09-27 ENCOUNTER — HOSPITAL ENCOUNTER (OUTPATIENT)
Dept: PHYSICAL THERAPY | Age: 27
Discharge: HOME | End: 2022-09-27
Payer: COMMERCIAL

## 2022-09-27 PROCEDURE — 97110 THERAPEUTIC EXERCISES: CPT | Performed by: PHYSICAL THERAPIST

## 2022-09-27 NOTE — PROGRESS NOTES
PT DAILY TREATMENT NOTE - [facility] 2-15    Patient Name: Maci Larkin  Date:2022  : 1995  [x]  Patient  Verified  Payor: Oly Jes / Plan: [employer] HMO/CHOICE PLUS/POS / Product Type: HMO /    In time: 1200 PM  Out time: 100  PM  Total Treatment Time (min):60  Total Timed Codes (min): 50  1:1 Treatment Time [facility]):n/a     Visit #: 12    Treatment Area: Left ankle pain [M25.572]    SUBJECTIVE    Pain Level (0-10 scale), subjective functional status/changes: Pt reports 0/10 today. Pt reports she was able to participate in non contact practice Tues/Thurs without any inc. Pain. She was also able to do sprints on the weekend in grass without inc. Symptoms. She did wear her brace. Any medication changes, allergies to medications, adverse drug reactions, diagnosis change, or new procedure performed?: [x] No    [] Yes (see summary sheet for update) SEE ABOVE. OBJECTIVE           10' min Modality:  cold pack     [x]  Ice     []  Heat       Position/location:   left ankle. Rationale: decrease pain to improve the patients ability to do functional activities   [x] Skin assessment post-treatment:  [x]intact []redness- no adverse reaction    []redness - adverse reaction:      50 min Therapeutic Exercise:  [x] See flow sheet :          Rationale: increase strength, improve coordination and increase proprioception to improve the patients ability to return to playing rugby. 0 min Manual Therapy:     Rationale: decrease pain, increase tissue extensibility and decrease trigger points  to improve the patients ability to return to playing rugby.             With   [] TE   [] TA   [] neuro   [] other: Patient Education: [x] Review HEP    [] Progressed/Changed HEP based on:   [] positioning   [] body mechanics   [] transfers   [] heat/ice application    [] other:      Pain Level (0-10 scale) post treatment: 0    ASSESSMENT/Changes in Function:   Pt has done well with non - contact rugby practice. She plans to continue to ease into contact and may try just hitting the pads next week before full contact. Next game will be the end of October. PT and pt agree pt OK to try HEP on her own, will schedule only as needed next few weeks. Patient will continue to benefit from skilled PT services to modify and progress therapeutic interventions, address functional mobility deficits, address ROM deficits, address strength deficits, analyze and address soft tissue restrictions, analyze and cue movement patterns, assess and modify postural abnormalities, address imbalance/dizziness, and instruct in home and community integration to attain remaining goals. Short Term Goals: To be accomplished in 2-3 weeks:              1) Pt independent in HEP from day 1 MET               2) Pt will improved ankle DF to 0-5 deg for improved ROM needed for running progressing. MET               3) Pt will improve SLS L LE to 30 sec or more with EO and EC for improved balance needed for sport partially met. MET                 Long Term Goals: To be accomplished in 6-8 weeks:              1) Pt independent in final HEP. Progressing. 2) Pt will be able to run / walk for 10 ' without increased pain. MET              3) Pt will be able to participate in rugby practice without increased pain progressing. 4) Pt will be able to perform x 5 single limb squat and x 5 step down from 6 \" step L LE without increased pain for improved strength / stability needed for rugby  MET  5) Pt able to jump x 5 and hop x 5 without increased pain to be able to participate in rugby practice and games. MET     PLAN      [x]  Continue plan of care    [x]  Other:_ pt to schedule only as needed over next 2-4 weeks, will D/C if no appt. Made.       Harjinder Vazquez, PT  9/27/2022

## 2022-09-29 ENCOUNTER — APPOINTMENT (OUTPATIENT)
Dept: PHYSICAL THERAPY | Age: 27
End: 2022-09-29
Payer: COMMERCIAL

## 2022-12-01 NOTE — ANCILLARY DISCHARGE INSTRUCTIONS
[facility]  [address], [place], [address]  Phone: [phone]  Fax: [phone]    Discharge Summary    Name: Anjel Gay   : 1995   MD: Cresencio Kaufman MD       Treatment Diagnosis: Left ankle pain [M25.572]  Start of Care: 08/15/22    Visits from Start of Care: 12  Missed Visits: none    Summary of Care:Pt was last seen , was able to participate in non-contact rugby practice. She was to continue with HEP and f/u only as needed. Pt f/u with PT via email and reported she was able to participate in rugby match without any pain. Pt ready for D/C to HEP, all goals met.         Marilu Boudreaux, PT 2022 1:03 PM

## 2023-05-16 ENCOUNTER — HOSPITAL ENCOUNTER (OUTPATIENT)
Facility: HOSPITAL | Age: 28
Setting detail: RECURRING SERIES
Discharge: HOME | End: 2023-05-19
Payer: COMMERCIAL

## 2023-05-16 PROCEDURE — 97016 VASOPNEUMATIC DEVICE THERAPY: CPT

## 2023-05-16 PROCEDURE — 97110 THERAPEUTIC EXERCISES: CPT

## 2023-05-16 PROCEDURE — 97161 PT EVAL LOW COMPLEX 20 MIN: CPT

## 2023-05-16 NOTE — THERAPY EVALUATION
PHYSICAL THERAPY - MEDICARE DAILY TREATMENT NOTE (updated 3/23)      Date: 2023          Patient Name:  Julio Gibson :  1995   Medical   Diagnosis:  Right shoulder pain [M25.511] Treatment Diagnosis:  M25.513  RIGHT SHOULDER PAIN    Referral Source:  Frida Gong MD Insurance:   Payor: Nya Wesley / Plan: [employer] Sample (HMO) / Product Type: *No Product type* /                     Patient  verified yes     Visit #   Current  / Total 1 N/a    Time   In / Out 3:00  410   Total Treatment Time 70   Total Timed Codes 25   1:1 Treatment Time 25      [facility] Totals Reminder:  bill using total billable   min of TIMED therapeutic procedures and modalities. 8-22 min = 1 unit; 23-37 min = 2 units; 38-52 min = 3 units; 53-67 min = 4 units; 68-82 min = 5 units                    SUBJECTIVE    Start of Care: 2023    Any medication changes, allergies to medications, adverse drug reactions, diagnosis change, or new procedure performed?: [x] No    [] Yes (see summary sheet for update)  Medications: Verified on Patient Summary List    Date of onset/injury/LÓPEZ: \"I got tackled with my shoulder into the ground. . playing rugby\"  \"Injury happened . \"  \"Did go to the ER day of injury. . had x-rays there. . they said it looking like there was some space. . wear a sling until you can see the Dr.\"  \"Saw Dr. Priyanka Robin May 2nd. . he discharged the sling and gave duy to advance exercises. \"  \"Another x-ray was taken\"    Pt is not playing rugby right now. She is doing shoulder flexion, ABD, scaption with a can of beans. [place] [employer]. .. row and shoulder ext. .shoulder taps. Aggravated by:  sudden movements, picking up something heavy, 12 lb cat. Can't sleep on R side. Eased by: rest.      Pain Level (0-10 scale): current 0/10 worst 5/10     Location of symptoms: anterior shoulder pain at [facility] joint region. Sharp / pinching.        PMH/surgical history: Significant for + ankle sprain, lis franc injury,

## 2023-05-18 ENCOUNTER — HOSPITAL ENCOUNTER (OUTPATIENT)
Facility: HOSPITAL | Age: 28
Setting detail: RECURRING SERIES
Discharge: HOME | End: 2023-05-21
Payer: COMMERCIAL

## 2023-05-18 PROCEDURE — 97140 MANUAL THERAPY 1/> REGIONS: CPT

## 2023-05-18 PROCEDURE — 97110 THERAPEUTIC EXERCISES: CPT

## 2023-05-23 ENCOUNTER — HOSPITAL ENCOUNTER (OUTPATIENT)
Facility: HOSPITAL | Age: 28
Setting detail: RECURRING SERIES
Discharge: HOME | End: 2023-05-26
Payer: COMMERCIAL

## 2023-05-23 PROCEDURE — 97110 THERAPEUTIC EXERCISES: CPT

## 2023-05-23 PROCEDURE — 97140 MANUAL THERAPY 1/> REGIONS: CPT

## 2023-05-23 NOTE — PROGRESS NOTES
PHYSICAL THERAPY - MEDICARE DAILY TREATMENT NOTE (updated 3/23)      Date: 2023          Patient Name:  Tim Galeano :  1995   Medical   Diagnosis:  Right shoulder pain [M25.511] Treatment Diagnosis:  M25.513  RIGHT SHOULDER PAIN    Referral Source:  Franc Avalos MD Insurance:   Payor: Ana Mclean / Plan: [facility] [employer] (HMO) / Product Type: *No Product type* /                     Patient  verified yes     Visit #   Current  / Total 3 N/a   Time   In / Out 1200 1250   Total Treatment Time 50   Total Timed Codes 40   1:1 Treatment Time 40      [facility] Totals Reminder:  bill using total billable   min of TIMED therapeutic procedures and modalities. 8-22 min = 1 unit; 23-37 min = 2 units; 38-52 min = 3 units; 53-67 min = 4 units; 68-82 min = 5 units            SUBJECTIVE    Pain Level (0-10 scale) / Subjective functional status/changes: Pt reports 2/10 pain. Pt reports she did do some mopping yesterday and irritated her shoulder. Any medication changes, allergies to medications, adverse drug reactions, diagnosis change, or new procedure performed?: [x] No    [] Yes (see summary sheet for update)  Medications: Verified on Patient Summary List      OBJECTIVE    Objective/Functional Measures if applicable:  TTP R infraspinatus muscle. Therapeutic Procedures: Tx Min Billable or 1:1 Min (if diff from Tx Min) Procedure, Rationale, Specifics   15  48810 Therapeutic Exercise (timed):  increase ROM, strength, coordination, balance, and proprioception to improve patient's ability to progress to PLOF and address remaining functional goals. (see flow sheet as applicable)     Details if applicable:     25  15595 Manual Therapy (timed):  decrease pain to improve patient's ability to progress to PLOF and address remaining functional goals. The manual therapy interventions were performed at a separate and distinct time from the therapeutic activities interventions .  (see flow sheet as applicable)

## 2023-05-25 ENCOUNTER — HOSPITAL ENCOUNTER (OUTPATIENT)
Facility: HOSPITAL | Age: 28
Setting detail: RECURRING SERIES
Discharge: HOME | End: 2023-05-28
Payer: COMMERCIAL

## 2023-05-25 PROCEDURE — 97140 MANUAL THERAPY 1/> REGIONS: CPT

## 2023-05-25 PROCEDURE — 97110 THERAPEUTIC EXERCISES: CPT

## 2023-05-30 ENCOUNTER — HOSPITAL ENCOUNTER (OUTPATIENT)
Facility: HOSPITAL | Age: 28
Setting detail: RECURRING SERIES
Discharge: HOME | End: 2023-06-02
Payer: COMMERCIAL

## 2023-05-30 PROCEDURE — 97140 MANUAL THERAPY 1/> REGIONS: CPT

## 2023-05-30 PROCEDURE — 97110 THERAPEUTIC EXERCISES: CPT

## 2023-05-30 NOTE — PROGRESS NOTES
PHYSICAL THERAPY - MEDICARE DAILY TREATMENT NOTE (updated 3/23)      Date: 2023          Patient Name:  Opal Solis :  1995   Medical   Diagnosis:  Right shoulder pain [M25.511] Treatment Diagnosis:  M25.513  RIGHT SHOULDER PAIN    Referral Source:  Jayy Mejia MD Insurance:   Payor: Anayeli Larkin / Plan: Related Content Database (RCD[employer]) Wan (O) / Product Type: *No Product type* /                     Patient  verified yes     Visit #   Current  / Total 5 N/a   Time   In / Out 405 500   Total Treatment Time 55   Total Timed Codes 45   1:1 Treatment Time 39      [facility] Totals Reminder:  bill using total billable   min of TIMED therapeutic procedures and modalities. 8-22 min = 1 unit; 23-37 min = 2 units; 38-52 min = 3 units; 53-67 min = 4 units; 68-82 min = 5 units            SUBJECTIVE    Pain Level (0-10 scale) / Subjective functional status/changes: Pt reports 0/10 pain. Pt reports f/u with ortho. And said if she can't lift over her head in 6 weeks to come back and see him. Any medication changes, allergies to medications, adverse drug reactions, diagnosis change, or new procedure performed?: [x] No    [] Yes (see summary sheet for update)  Medications: Verified on Patient Summary List      OBJECTIVE    Objective/Functional Measures if applicable:      Therapeutic Procedures: Tx Min Billable or 1:1 Min (if diff from Tx Min) Procedure, Rationale, Specifics   30  40050 Therapeutic Exercise (timed):  increase ROM, strength, coordination, balance, and proprioception to improve patient's ability to progress to PLOF and address remaining functional goals. (see flow sheet as applicable)     Details if applicable:  progressed to prone prop with reach (reaching with L UE) and quadruped for stability work. 15  27416 Manual Therapy (timed):  decrease pain to improve patient's ability to progress to PLOF and address remaining functional goals.   The manual therapy interventions were performed at a separate and

## 2023-06-01 ENCOUNTER — HOSPITAL ENCOUNTER (OUTPATIENT)
Facility: HOSPITAL | Age: 28
Setting detail: RECURRING SERIES
Discharge: HOME | End: 2023-06-04
Payer: COMMERCIAL

## 2023-06-01 PROCEDURE — 97110 THERAPEUTIC EXERCISES: CPT

## 2023-06-01 PROCEDURE — 97140 MANUAL THERAPY 1/> REGIONS: CPT

## 2023-06-06 ENCOUNTER — HOSPITAL ENCOUNTER (OUTPATIENT)
Facility: HOSPITAL | Age: 28
Setting detail: RECURRING SERIES
Discharge: HOME | End: 2023-06-09
Payer: COMMERCIAL

## 2023-06-06 PROCEDURE — 97140 MANUAL THERAPY 1/> REGIONS: CPT

## 2023-06-06 PROCEDURE — 97110 THERAPEUTIC EXERCISES: CPT

## 2023-06-06 NOTE — PROGRESS NOTES
PHYSICAL THERAPY - MEDICARE DAILY TREATMENT NOTE (updated 3/23)      Date: 2023          Patient Name:  Shaquille Garcia :  1995   Medical   Diagnosis:  Right shoulder pain [M25.511] Treatment Diagnosis:  M25.513  RIGHT SHOULDER PAIN    Referral Source:  Madelyn Rueda MD Insurance:   Payor: Lee Avendano / Plan: Junior Maxewll (O) / Product Type: *No Product type* /                     Patient  verified yes     Visit #   Current  / Total 7 N/a   Time   In / Out 1200 115   Total Treatment Time 75   Total Timed Codes 60   1:1 Treatment Time 60       [facility] Totals Reminder:  bill using total billable   min of TIMED therapeutic procedures and modalities. 8-22 min = 1 unit; 23-37 min = 2 units; 38-52 min = 3 units; 53-67 min = 4 units; 68-82 min = 5 units            SUBJECTIVE    Pain Level (0-10 scale) / Subjective functional status/changes: Pt reports 0/10 pain. Pt reports no pain after last session, did try a run, did intervals, had a little pain with first interval of running but no residual pain afterwards. . she would like to return to push ups, pull ups, front squats. .     Any medication changes, allergies to medications, adverse drug reactions, diagnosis change, or new procedure performed?: [x] No    [] Yes (see summary sheet for update)  Medications: Verified on Patient Summary List      OBJECTIVE    Objective/Functional Measures if applicable:    Shoulder AROM:  flexion at least 155 deg, ABD at least 155 deg, Functional IR to T10 V.L. Therapeutic Procedures: Tx Min Billable or 1:1 Min (if diff from Tx Min) Procedure, Rationale, Specifics   45  85650 Therapeutic Exercise (timed):  increase ROM, strength, coordination, balance, and proprioception to improve patient's ability to progress to PLOF and address remaining functional goals.  (see flow sheet as applicable)     Details if applicable:  progressed to quadruped hand knee crawl 1 laps then to hand toe 1 lap,  Quadruped med ball drop /

## 2023-06-13 ENCOUNTER — APPOINTMENT (OUTPATIENT)
Facility: HOSPITAL | Age: 28
End: 2023-06-13
Payer: COMMERCIAL

## 2023-06-15 ENCOUNTER — APPOINTMENT (OUTPATIENT)
Facility: HOSPITAL | Age: 28
End: 2023-06-15
Payer: COMMERCIAL

## 2025-04-23 ENCOUNTER — TELEPHONE (OUTPATIENT)
Age: 30
End: 2025-04-23

## 2025-05-15 ENCOUNTER — OFFICE VISIT (OUTPATIENT)
Age: 30
End: 2025-05-15
Payer: COMMERCIAL

## 2025-05-15 VITALS
TEMPERATURE: 97.7 F | RESPIRATION RATE: 16 BRPM | BODY MASS INDEX: 26.83 KG/M2 | HEIGHT: 62 IN | HEART RATE: 82 BPM | WEIGHT: 145.8 LBS | SYSTOLIC BLOOD PRESSURE: 110 MMHG | OXYGEN SATURATION: 98 % | DIASTOLIC BLOOD PRESSURE: 74 MMHG

## 2025-05-15 DIAGNOSIS — Z12.4 CERVICAL CANCER SCREENING: ICD-10-CM

## 2025-05-15 DIAGNOSIS — Z13.1 SCREENING FOR DIABETES MELLITUS: ICD-10-CM

## 2025-05-15 DIAGNOSIS — Z00.00 ROUTINE GENERAL MEDICAL EXAMINATION AT HEALTH CARE FACILITY: Primary | ICD-10-CM

## 2025-05-15 DIAGNOSIS — Z13.220 SCREENING FOR LIPID DISORDERS: ICD-10-CM

## 2025-05-15 DIAGNOSIS — R19.09 MASS OF RIGHT INGUINAL REGION: ICD-10-CM

## 2025-05-15 DIAGNOSIS — Z11.3 SCREENING FOR STD (SEXUALLY TRANSMITTED DISEASE): ICD-10-CM

## 2025-05-15 PROCEDURE — G8419 CALC BMI OUT NRM PARAM NOF/U: HCPCS | Performed by: FAMILY MEDICINE

## 2025-05-15 PROCEDURE — 99213 OFFICE O/P EST LOW 20 MIN: CPT | Performed by: FAMILY MEDICINE

## 2025-05-15 PROCEDURE — 1036F TOBACCO NON-USER: CPT | Performed by: FAMILY MEDICINE

## 2025-05-15 PROCEDURE — 99395 PREV VISIT EST AGE 18-39: CPT | Performed by: FAMILY MEDICINE

## 2025-05-15 PROCEDURE — G8427 DOCREV CUR MEDS BY ELIG CLIN: HCPCS | Performed by: FAMILY MEDICINE

## 2025-05-15 SDOH — ECONOMIC STABILITY: FOOD INSECURITY: WITHIN THE PAST 12 MONTHS, YOU WORRIED THAT YOUR FOOD WOULD RUN OUT BEFORE YOU GOT MONEY TO BUY MORE.: NEVER TRUE

## 2025-05-15 SDOH — ECONOMIC STABILITY: FOOD INSECURITY: WITHIN THE PAST 12 MONTHS, THE FOOD YOU BOUGHT JUST DIDN'T LAST AND YOU DIDN'T HAVE MONEY TO GET MORE.: NEVER TRUE

## 2025-05-15 ASSESSMENT — PATIENT HEALTH QUESTIONNAIRE - PHQ9
SUM OF ALL RESPONSES TO PHQ QUESTIONS 1-9: 0
1. LITTLE INTEREST OR PLEASURE IN DOING THINGS: NOT AT ALL
2. FEELING DOWN, DEPRESSED OR HOPELESS: NOT AT ALL
SUM OF ALL RESPONSES TO PHQ QUESTIONS 1-9: 0

## 2025-05-15 NOTE — PROGRESS NOTES
Patient Name: Urmila Joyner   MRN: [id number]    ASSESSMENT AND PLAN  Urmila Joyner is a 30 y.o. female who presents today for:    Assessment & Plan  Routine general medical examination at health care facility   Reviewed age appropriate screening tests as recommended by the [place]PST[place] Preventive Services Database with patient today.    Orders:    CBC; Future    Comprehensive Metabolic Panel; Future    Hemoglobin A1C; Future    Lipid Panel; Future    Hepatitis Panel, Acute; Future    Chlamydia, Gonorrhea, Trichomoniasis; Future    HIV 1/2 Ag/Ab, 4TH Generation,W Rflx Confirm; Future    RPR; Future    Screening for diabetes mellitus     Orders:    Hemoglobin A1C; Future    Screening for lipid disorders     Orders:    CBC; Future    Comprehensive Metabolic Panel; Future    Lipid Panel; Future    Cervical cancer screening    Pt to follow up with OBGYN    Orders:    [facility] - Moriah Rocha MD, [facility] ([address])    Screening for STD (sexually transmitted disease)     Orders:    Hepatitis Panel, Acute; Future    Chlamydia, Gonorrhea, Trichomoniasis; Future    HIV 1/2 Ag/Ab, 4TH Generation,W Rflx Confirm; Future    RPR; Future    Mass of right inguinal region    Suspect resolving hematoma but will obtain ultrasound to evaluate further.    Orders:    US EXTREMITY RIGHT NON VASC LIMITED; Future      No orders of the defined types were placed in this encounter.       There are no discontinued medications.    Return in about 1 year (around 5/15/2026) for CPE.      SUBJECTIVE  Urmila Joyner is a 30 y.o. female who presents with the following:     Cervical Cancer Screening: not up to date - pt to see obgyn.   Colon Cancer Screening: father had colon cancer around age 50; she will inquire with family members on when she was supposed to get a screening colonoscopy.    CAD risk factors:  HTN: wnl  BP Readings from Last 3 Encounters:   05/15/25 110/74   08/02/22 118/78   04/19/22 124/70     Lipid: due  Lab Results   Component

## 2025-05-15 NOTE — PROGRESS NOTES
Chief Complaint   Patient presents with    Annual Exam    Mass     On right side of groin area.      \"Have you been to the ER, urgent care clinic since your last visit?  Hospitalized since your last visit?\"    Yes. Urgent care- 4/2025- lump on right side of groin area.     “Have you seen or consulted any other health care providers outside of [facility] since your last visit?”    NO        “Have you had a pap smear?”    NO    Date of last Cervical Cancer screen (HPV or PAP): 3/16/2021              Financial Resource Strain: Not on file      Food Insecurity: No Food Insecurity (5/15/2025)    Hunger Vital Sign     Worried About Running Out of Food in the Last Year: Never true     Ran Out of Food in the Last Year: Never true            5/15/2025     2:16 PM   PHQ-9    Little interest or pleasure in doing things 0   Feeling down, depressed, or hopeless 0   PHQ-2 Score 0   PHQ-9 Total Score 0       Health Maintenance Due   Topic Date Due    Depression Screen  Never done    Varicella vaccine (1 of 2 - 13+ 2-dose series) Never done    DTaP/Tdap/Td vaccine (2 - Td or Tdap) 03/27/2018    HPV vaccine (3 - 3-dose series) 10/13/2021    Pap smear  03/16/2024    COVID-19 Vaccine (4 - 2024-25 season) 09/01/2024

## 2025-05-16 LAB
-: NORMAL
ALBUMIN SERPL-MCNC: 4 G/DL (ref 3.5–5)
ALBUMIN/GLOB SERPL: 1.3 (ref 1.1–2.2)
ALP SERPL-CCNC: 77 U/L (ref 45–117)
ALT SERPL-CCNC: 17 U/L (ref 12–78)
ANION GAP SERPL CALC-SCNC: 4 MMOL/L (ref 2–12)
AST SERPL-CCNC: 16 U/L (ref 15–37)
BILIRUB SERPL-MCNC: 0.3 MG/DL (ref 0.2–1)
BUN SERPL-MCNC: 11 MG/DL (ref 6–20)
BUN/CREAT SERPL: 19 (ref 12–20)
CALCIUM SERPL-MCNC: 8.8 MG/DL (ref 8.5–10.1)
CHLORIDE SERPL-SCNC: 104 MMOL/L (ref 97–108)
CHOLEST SERPL-MCNC: 165 MG/DL
CO2 SERPL-SCNC: 28 MMOL/L (ref 21–32)
CREAT SERPL-MCNC: 0.58 MG/DL (ref 0.55–1.02)
ERYTHROCYTE [DISTWIDTH] IN BLOOD BY AUTOMATED COUNT: 13 % (ref 11.5–14.5)
EST. AVERAGE GLUCOSE BLD GHB EST-MCNC: 97 MG/DL
GLOBULIN SER CALC-MCNC: 3.1 G/DL (ref 2–4)
GLUCOSE SERPL-MCNC: 77 MG/DL (ref 65–100)
HAV IGM SER QL: NONREACTIVE
HBA1C MFR BLD: 5 % (ref 4–5.6)
HBV CORE IGM SER QL: NONREACTIVE
HBV SURFACE AG SER QL: <0.1 INDEX
HBV SURFACE AG SER QL: NEGATIVE
HCT VFR BLD AUTO: 36.8 % (ref 35–47)
HCV AB SER IA-ACNC: 0.04 INDEX
HCV AB SERPL QL IA: NONREACTIVE
HDLC SERPL-MCNC: 65 MG/DL
HDLC SERPL: 2.5 (ref 0–5)
HGB BLD-MCNC: 11.5 G/DL (ref 11.5–16)
HIV 1+2 AB+HIV1 P24 AG SERPL QL IA: NONREACTIVE
HIV 1/2 RESULT COMMENT: NORMAL
LDLC SERPL CALC-MCNC: 85.8 MG/DL (ref 0–100)
MCH RBC QN AUTO: 26.9 PG (ref 26–34)
MCHC RBC AUTO-ENTMCNC: 31.3 G/DL (ref 30–36.5)
MCV RBC AUTO: 86.2 FL (ref 80–99)
NRBC # BLD: 0 K/UL (ref 0–0.01)
NRBC BLD-RTO: 0 PER 100 WBC
PLATELET # BLD AUTO: 319 K/UL (ref 150–400)
PMV BLD AUTO: 9.9 FL (ref 8.9–12.9)
POTASSIUM SERPL-SCNC: 3.8 MMOL/L (ref 3.5–5.1)
PROT SERPL-MCNC: 7.1 G/DL (ref 6.4–8.2)
RBC # BLD AUTO: 4.27 M/UL (ref 3.8–5.2)
RPR SER QL: NONREACTIVE
SODIUM SERPL-SCNC: 136 MMOL/L (ref 136–145)
TRIGL SERPL-MCNC: 71 MG/DL
VLDLC SERPL CALC-MCNC: 14.2 MG/DL
WBC # BLD AUTO: 6.3 K/UL (ref 3.6–11)

## 2025-05-19 LAB
C TRACH RRNA SPEC QL NAA+PROBE: NEGATIVE
N GONORRHOEA RRNA SPEC QL NAA+PROBE: NEGATIVE
SPECIMEN SOURCE: 1826
T VAGINALIS RRNA SPEC QL NAA+PROBE: NEGATIVE

## 2025-05-20 ENCOUNTER — RESULTS FOLLOW-UP (OUTPATIENT)
Age: 30
End: 2025-05-20

## 2025-05-20 ENCOUNTER — HOSPITAL ENCOUNTER (OUTPATIENT)
Age: 30
Discharge: HOME | End: 2025-05-23
Payer: COMMERCIAL

## 2025-05-20 DIAGNOSIS — R19.09 MASS OF RIGHT INGUINAL REGION: ICD-10-CM

## 2025-05-20 PROCEDURE — 76882 US LMTD JT/FCL EVL NVASC XTR: CPT
